# Patient Record
Sex: FEMALE | Race: WHITE | Employment: UNEMPLOYED | URBAN - METROPOLITAN AREA
[De-identification: names, ages, dates, MRNs, and addresses within clinical notes are randomized per-mention and may not be internally consistent; named-entity substitution may affect disease eponyms.]

---

## 2017-01-01 ENCOUNTER — CARE COORDINATION (OUTPATIENT)
Dept: CARE COORDINATION | Age: 82
End: 2017-01-01

## 2017-01-01 ENCOUNTER — CARE COORDINATION (OUTPATIENT)
Dept: CASE MANAGEMENT | Age: 82
End: 2017-01-01

## 2017-01-01 ENCOUNTER — HOSPITAL ENCOUNTER (OUTPATIENT)
Dept: PULMONOLOGY | Age: 82
Discharge: HOME | End: 2017-11-09
Payer: MEDICARE

## 2017-01-01 ENCOUNTER — OFFICE VISIT (OUTPATIENT)
Dept: INTERNAL MEDICINE CLINIC | Age: 82
End: 2017-01-01
Payer: MEDICARE

## 2017-01-01 ENCOUNTER — APPOINTMENT (OUTPATIENT)
Dept: CT IMAGING | Age: 82
DRG: 389 | End: 2017-01-01
Payer: MEDICARE

## 2017-01-01 ENCOUNTER — OFFICE VISIT (OUTPATIENT)
Dept: FAMILY MEDICINE CLINIC | Age: 82
End: 2017-01-01
Payer: MEDICARE

## 2017-01-01 ENCOUNTER — PROCEDURE VISIT (OUTPATIENT)
Dept: UROLOGY | Facility: CLINIC | Age: 82
End: 2017-01-01

## 2017-01-01 ENCOUNTER — TELEPHONE (OUTPATIENT)
Dept: FAMILY MEDICINE CLINIC | Age: 82
End: 2017-01-01

## 2017-01-01 ENCOUNTER — APPOINTMENT (OUTPATIENT)
Dept: CT IMAGING | Age: 82
End: 2017-01-01
Payer: MEDICARE

## 2017-01-01 ENCOUNTER — APPOINTMENT (OUTPATIENT)
Dept: GENERAL RADIOLOGY | Age: 82
DRG: 389 | End: 2017-01-01
Payer: MEDICARE

## 2017-01-01 ENCOUNTER — HOSPITAL ENCOUNTER (OUTPATIENT)
Age: 82
Discharge: HOME | End: 2017-10-26
Payer: MEDICARE

## 2017-01-01 ENCOUNTER — HOSPITAL ENCOUNTER (OUTPATIENT)
Age: 82
Setting detail: SPECIMEN
Discharge: HOME | End: 2017-06-12
Payer: MEDICARE

## 2017-01-01 ENCOUNTER — APPOINTMENT (OUTPATIENT)
Dept: INTERVENTIONAL RADIOLOGY/VASCULAR | Age: 82
DRG: 186 | End: 2017-01-01
Attending: INTERNAL MEDICINE
Payer: MEDICARE

## 2017-01-01 ENCOUNTER — APPOINTMENT (OUTPATIENT)
Dept: GENERAL RADIOLOGY | Age: 82
DRG: 186 | End: 2017-01-01
Attending: INTERNAL MEDICINE
Payer: MEDICARE

## 2017-01-01 ENCOUNTER — HOSPITAL ENCOUNTER (INPATIENT)
Age: 82
LOS: 5 days | Discharge: NURSING, REHAB OR LONG-TERM CARE | DRG: 389 | End: 2017-06-18
Attending: EMERGENCY MEDICINE | Admitting: INTERNAL MEDICINE
Payer: MEDICARE

## 2017-01-01 ENCOUNTER — HOSPITAL ENCOUNTER (INPATIENT)
Age: 82
LOS: 3 days | Discharge: HOME HEALTH | DRG: 186 | End: 2017-11-17
Attending: INTERNAL MEDICINE | Admitting: INTERNAL MEDICINE
Payer: MEDICARE

## 2017-01-01 ENCOUNTER — HOSPITAL ENCOUNTER (EMERGENCY)
Age: 82
Discharge: HOME | End: 2017-06-05
Attending: EMERGENCY MEDICINE
Payer: MEDICARE

## 2017-01-01 ENCOUNTER — HOSPITAL ENCOUNTER (OUTPATIENT)
Age: 82
Setting detail: SPECIMEN
Discharge: HOME | End: 2017-06-22
Payer: MEDICARE

## 2017-01-01 ENCOUNTER — HOSPITAL ENCOUNTER (OUTPATIENT)
Age: 82
Setting detail: SPECIMEN
Discharge: HOME | End: 2017-06-13
Payer: MEDICARE

## 2017-01-01 ENCOUNTER — APPOINTMENT (OUTPATIENT)
Dept: GENERAL RADIOLOGY | Age: 82
End: 2017-01-01
Payer: MEDICARE

## 2017-01-01 ENCOUNTER — HOSPITAL ENCOUNTER (OUTPATIENT)
Dept: CT IMAGING | Age: 82
Discharge: HOME | End: 2017-11-09
Payer: MEDICARE

## 2017-01-01 ENCOUNTER — HOSPITAL ENCOUNTER (OUTPATIENT)
Age: 82
Setting detail: SPECIMEN
Discharge: HOME | End: 2017-06-28
Payer: MEDICARE

## 2017-01-01 ENCOUNTER — PROCEDURE VISIT (OUTPATIENT)
Dept: UROLOGY | Age: 82
End: 2017-01-01
Payer: MEDICARE

## 2017-01-01 ENCOUNTER — HOSPITAL ENCOUNTER (EMERGENCY)
Age: 82
Discharge: HOME | End: 2017-06-03
Attending: EMERGENCY MEDICINE
Payer: MEDICARE

## 2017-01-01 ENCOUNTER — HOSPITAL ENCOUNTER (OUTPATIENT)
Age: 82
Setting detail: SPECIMEN
Discharge: HOME | End: 2017-06-26
Payer: MEDICARE

## 2017-01-01 ENCOUNTER — HOSPITAL ENCOUNTER (OUTPATIENT)
Age: 82
Setting detail: SPECIMEN
Discharge: HOME | End: 2017-06-27
Payer: MEDICARE

## 2017-01-01 ENCOUNTER — CARE COORDINATION (OUTPATIENT)
Dept: FAMILY MEDICINE CLINIC | Age: 82
End: 2017-01-01

## 2017-01-01 VITALS
WEIGHT: 107.58 LBS | SYSTOLIC BLOOD PRESSURE: 158 MMHG | HEIGHT: 55 IN | DIASTOLIC BLOOD PRESSURE: 80 MMHG | HEART RATE: 64 BPM | BODY MASS INDEX: 24.9 KG/M2 | RESPIRATION RATE: 16 BRPM

## 2017-01-01 VITALS
TEMPERATURE: 97.9 F | DIASTOLIC BLOOD PRESSURE: 72 MMHG | HEIGHT: 55 IN | HEART RATE: 85 BPM | BODY MASS INDEX: 22.77 KG/M2 | OXYGEN SATURATION: 99 % | RESPIRATION RATE: 20 BRPM | WEIGHT: 98.4 LBS | SYSTOLIC BLOOD PRESSURE: 138 MMHG

## 2017-01-01 VITALS
HEIGHT: 55 IN | BODY MASS INDEX: 21.52 KG/M2 | SYSTOLIC BLOOD PRESSURE: 138 MMHG | HEART RATE: 76 BPM | WEIGHT: 93 LBS | OXYGEN SATURATION: 97 % | DIASTOLIC BLOOD PRESSURE: 80 MMHG

## 2017-01-01 VITALS
SYSTOLIC BLOOD PRESSURE: 138 MMHG | WEIGHT: 65.26 LBS | BODY MASS INDEX: 15.1 KG/M2 | DIASTOLIC BLOOD PRESSURE: 59 MMHG | RESPIRATION RATE: 18 BRPM | TEMPERATURE: 97.5 F | HEIGHT: 55 IN | HEART RATE: 73 BPM | OXYGEN SATURATION: 93 %

## 2017-01-01 VITALS
DIASTOLIC BLOOD PRESSURE: 80 MMHG | SYSTOLIC BLOOD PRESSURE: 130 MMHG | BODY MASS INDEX: 20.6 KG/M2 | WEIGHT: 89 LBS | HEIGHT: 55 IN

## 2017-01-01 VITALS
SYSTOLIC BLOOD PRESSURE: 150 MMHG | TEMPERATURE: 97.5 F | HEART RATE: 83 BPM | RESPIRATION RATE: 18 BRPM | WEIGHT: 107.58 LBS | DIASTOLIC BLOOD PRESSURE: 81 MMHG | BODY MASS INDEX: 24.9 KG/M2 | OXYGEN SATURATION: 93 % | HEIGHT: 55 IN

## 2017-01-01 VITALS
DIASTOLIC BLOOD PRESSURE: 106 MMHG | TEMPERATURE: 97.7 F | HEART RATE: 76 BPM | RESPIRATION RATE: 18 BRPM | OXYGEN SATURATION: 98 % | SYSTOLIC BLOOD PRESSURE: 169 MMHG

## 2017-01-01 VITALS
DIASTOLIC BLOOD PRESSURE: 60 MMHG | TEMPERATURE: 97.7 F | BODY MASS INDEX: 22.7 KG/M2 | RESPIRATION RATE: 16 BRPM | SYSTOLIC BLOOD PRESSURE: 135 MMHG | HEIGHT: 55 IN | HEART RATE: 79 BPM | WEIGHT: 98.11 LBS

## 2017-01-01 VITALS
SYSTOLIC BLOOD PRESSURE: 110 MMHG | WEIGHT: 82 LBS | HEIGHT: 55 IN | BODY MASS INDEX: 18.97 KG/M2 | DIASTOLIC BLOOD PRESSURE: 80 MMHG

## 2017-01-01 VITALS
HEART RATE: 76 BPM | WEIGHT: 98 LBS | TEMPERATURE: 97.4 F | RESPIRATION RATE: 19 BRPM | HEIGHT: 55 IN | OXYGEN SATURATION: 98 % | BODY MASS INDEX: 22.68 KG/M2 | SYSTOLIC BLOOD PRESSURE: 120 MMHG | DIASTOLIC BLOOD PRESSURE: 80 MMHG

## 2017-01-01 VITALS
HEIGHT: 55 IN | DIASTOLIC BLOOD PRESSURE: 60 MMHG | WEIGHT: 96 LBS | SYSTOLIC BLOOD PRESSURE: 170 MMHG | BODY MASS INDEX: 22.21 KG/M2

## 2017-01-01 DIAGNOSIS — I10 ESSENTIAL HYPERTENSION: ICD-10-CM

## 2017-01-01 DIAGNOSIS — I50.32 CHRONIC DIASTOLIC CHF (CONGESTIVE HEART FAILURE) (HCC): ICD-10-CM

## 2017-01-01 DIAGNOSIS — C67.8 MALIGNANT NEOPLASM OF OVERLAPPING SITES OF BLADDER (HCC): Primary | ICD-10-CM

## 2017-01-01 DIAGNOSIS — R06.02 SOB (SHORTNESS OF BREATH): ICD-10-CM

## 2017-01-01 DIAGNOSIS — M25.551 HIP PAIN, BILATERAL: Primary | ICD-10-CM

## 2017-01-01 DIAGNOSIS — I10 ESSENTIAL HYPERTENSION: Primary | Chronic | ICD-10-CM

## 2017-01-01 DIAGNOSIS — N17.9 AKI (ACUTE KIDNEY INJURY) (HCC): ICD-10-CM

## 2017-01-01 DIAGNOSIS — K90.89 OTHER SPECIFIED INTESTINAL MALABSORPTION: ICD-10-CM

## 2017-01-01 DIAGNOSIS — R33.8 ACUTE URINARY RETENTION: ICD-10-CM

## 2017-01-01 DIAGNOSIS — C67.4 MALIGNANT NEOPLASM OF POSTERIOR WALL OF URINARY BLADDER (HCC): ICD-10-CM

## 2017-01-01 DIAGNOSIS — E87.5 HYPERKALEMIA: ICD-10-CM

## 2017-01-01 DIAGNOSIS — Z93.3 STATUS POST COLOSTOMY (HCC): ICD-10-CM

## 2017-01-01 DIAGNOSIS — I10 ESSENTIAL HYPERTENSION: Chronic | ICD-10-CM

## 2017-01-01 DIAGNOSIS — Z85.038 HISTORY OF COLON CANCER: ICD-10-CM

## 2017-01-01 DIAGNOSIS — M25.552 HIP PAIN, BILATERAL: Primary | ICD-10-CM

## 2017-01-01 DIAGNOSIS — M79.604 PAIN OF RIGHT LOWER EXTREMITY: Primary | ICD-10-CM

## 2017-01-01 DIAGNOSIS — K57.33 DIVERTICULITIS OF LARGE INTESTINE WITHOUT PERFORATION OR ABSCESS WITH BLEEDING: ICD-10-CM

## 2017-01-01 DIAGNOSIS — R54 FRAIL ELDERLY: ICD-10-CM

## 2017-01-01 DIAGNOSIS — K56.609 SBO (SMALL BOWEL OBSTRUCTION) (HCC): ICD-10-CM

## 2017-01-01 DIAGNOSIS — D50.8 OTHER IRON DEFICIENCY ANEMIA: Primary | ICD-10-CM

## 2017-01-01 DIAGNOSIS — J34.89 LESION OF NOSE: ICD-10-CM

## 2017-01-01 DIAGNOSIS — J90 PLEURAL EFFUSION: ICD-10-CM

## 2017-01-01 DIAGNOSIS — N17.9 AKI (ACUTE KIDNEY INJURY) (HCC): Primary | ICD-10-CM

## 2017-01-01 DIAGNOSIS — E55.9 VITAMIN D DEFICIENCY: ICD-10-CM

## 2017-01-01 DIAGNOSIS — I50.32 CHRONIC DIASTOLIC CHF (CONGESTIVE HEART FAILURE) (HCC): Primary | ICD-10-CM

## 2017-01-01 DIAGNOSIS — R53.83 FATIGUE, UNSPECIFIED TYPE: ICD-10-CM

## 2017-01-01 DIAGNOSIS — R06.02 SOB (SHORTNESS OF BREATH): Primary | ICD-10-CM

## 2017-01-01 DIAGNOSIS — Z23 NEED FOR TDAP VACCINATION: ICD-10-CM

## 2017-01-01 DIAGNOSIS — I50.32 CHRONIC DIASTOLIC CONGESTIVE HEART FAILURE (HCC): ICD-10-CM

## 2017-01-01 DIAGNOSIS — J43.1 PANLOBULAR EMPHYSEMA (HCC): ICD-10-CM

## 2017-01-01 LAB
-: ABNORMAL
ABO/RH: NORMAL
ABSOLUTE BANDS #: 0.3 K/UL (ref 0–1)
ABSOLUTE BANDS #: 0.47 K/UL (ref 0–1)
ABSOLUTE BANDS #: 1.04 K/UL (ref 0–1)
ABSOLUTE EOS #: 0 K/UL (ref 0–0.4)
ABSOLUTE EOS #: 0.1 K/UL (ref 0–0.4)
ABSOLUTE EOS #: 0.1 K/UL (ref 0–0.4)
ABSOLUTE EOS #: 0.2 K/UL (ref 0–0.4)
ABSOLUTE IMMATURE GRANULOCYTE: ABNORMAL K/UL (ref 0–0.3)
ABSOLUTE LYMPH #: 0.44 K/UL (ref 1–4.8)
ABSOLUTE LYMPH #: 0.5 K/UL (ref 1–4.8)
ABSOLUTE LYMPH #: 0.7 K/UL (ref 1–4.8)
ABSOLUTE LYMPH #: 0.9 K/UL (ref 1–4.8)
ABSOLUTE LYMPH #: 1 K/UL (ref 1–4.8)
ABSOLUTE LYMPH #: 1.15 K/UL (ref 1–4.8)
ABSOLUTE LYMPH #: 1.26 K/UL (ref 1–4.8)
ABSOLUTE LYMPH #: 1.57 K/UL (ref 1–4.8)
ABSOLUTE MONO #: 0.45 K/UL (ref 0.1–1.3)
ABSOLUTE MONO #: 0.6 K/UL (ref 0.1–1.3)
ABSOLUTE MONO #: 0.63 K/UL (ref 0.1–1.3)
ABSOLUTE MONO #: 0.67 K/UL (ref 0.1–1.3)
ABSOLUTE MONO #: 0.89 K/UL (ref 0.1–1.3)
ABSOLUTE MONO #: 0.9 K/UL (ref 0.1–1.3)
ABSOLUTE MONO #: 1.2 K/UL (ref 0.1–1.3)
ABSOLUTE MONO #: 1.42 K/UL (ref 0.1–1.3)
ALBUMIN FLUID: 2.2 G/DL
ALBUMIN SERPL-MCNC: 3.5 G/DL (ref 3.5–5.2)
ALBUMIN/GLOBULIN RATIO: ABNORMAL (ref 1–2.5)
ALP BLD-CCNC: 204 U/L (ref 35–104)
ALT SERPL-CCNC: 32 U/L (ref 5–33)
AMORPHOUS: ABNORMAL
ANION GAP SERPL CALCULATED.3IONS-SCNC: 13 MMOL/L (ref 9–17)
ANION GAP SERPL CALCULATED.3IONS-SCNC: 14 MMOL/L (ref 9–17)
ANION GAP SERPL CALCULATED.3IONS-SCNC: 15 MMOL/L (ref 9–17)
ANION GAP SERPL CALCULATED.3IONS-SCNC: 15 MMOL/L (ref 9–17)
ANION GAP SERPL CALCULATED.3IONS-SCNC: 16 MMOL/L (ref 9–17)
ANION GAP SERPL CALCULATED.3IONS-SCNC: 16 MMOL/L (ref 9–17)
ANION GAP SERPL CALCULATED.3IONS-SCNC: 17 MMOL/L (ref 9–17)
ANION GAP SERPL CALCULATED.3IONS-SCNC: 18 MMOL/L (ref 9–17)
ANION GAP SERPL CALCULATED.3IONS-SCNC: 19 MMOL/L (ref 9–17)
ANION GAP SERPL CALCULATED.3IONS-SCNC: 20 MMOL/L (ref 9–17)
ANTIBODY SCREEN: NEGATIVE
ARM BAND NUMBER: NORMAL
AST SERPL-CCNC: 39 U/L
BACTERIA: ABNORMAL
BANDS: 2 %
BANDS: 3 %
BANDS: 7 %
BASOPHILS # BLD: 0 %
BASOPHILS # BLD: 1 %
BASOPHILS # BLD: 1 %
BASOPHILS ABSOLUTE: 0 K/UL (ref 0–0.2)
BASOPHILS ABSOLUTE: 0.1 K/UL (ref 0–0.2)
BASOPHILS ABSOLUTE: 0.1 K/UL (ref 0–0.2)
BASOPHILS ABSOLUTE: 0.15 K/UL (ref 0–0.2)
BILIRUB SERPL-MCNC: 0.65 MG/DL (ref 0.3–1.2)
BILIRUBIN DIRECT: 0.37 MG/DL
BILIRUBIN URINE: NEGATIVE
BILIRUBIN, INDIRECT: 0.28 MG/DL (ref 0–1)
BLD PROD TYP BPU: NORMAL
BLOOD BANK COMMENT: NORMAL
BNP INTERPRETATION: ABNORMAL
BUN BLDV-MCNC: 11 MG/DL (ref 8–23)
BUN BLDV-MCNC: 11 MG/DL (ref 8–23)
BUN BLDV-MCNC: 13 MG/DL (ref 8–23)
BUN BLDV-MCNC: 15 MG/DL
BUN BLDV-MCNC: 15 MG/DL (ref 8–23)
BUN BLDV-MCNC: 23 MG/DL (ref 8–23)
BUN BLDV-MCNC: 24 MG/DL (ref 8–23)
BUN BLDV-MCNC: 25 MG/DL (ref 8–23)
BUN BLDV-MCNC: 29 MG/DL (ref 8–23)
BUN BLDV-MCNC: 54 MG/DL (ref 8–23)
BUN BLDV-MCNC: 71 MG/DL (ref 8–23)
BUN BLDV-MCNC: 72 MG/DL (ref 8–23)
BUN BLDV-MCNC: 74 MG/DL (ref 8–23)
BUN BLDV-MCNC: 8 MG/DL (ref 8–23)
BUN/CREAT BLD: 13 (ref 9–20)
BUN/CREAT BLD: 27 (ref 9–20)
BUN/CREAT BLD: 33 (ref 9–20)
BUN/CREAT BLD: ABNORMAL (ref 9–20)
BUN/CREAT BLD: NORMAL (ref 9–20)
CALCIUM SERPL-MCNC: 7.6 MG/DL (ref 8.6–10.4)
CALCIUM SERPL-MCNC: 8 MG/DL (ref 8.6–10.4)
CALCIUM SERPL-MCNC: 8 MG/DL (ref 8.6–10.4)
CALCIUM SERPL-MCNC: 8.3 MG/DL (ref 8.6–10.4)
CALCIUM SERPL-MCNC: 8.5 MG/DL (ref 8.6–10.4)
CALCIUM SERPL-MCNC: 8.7 MG/DL (ref 8.6–10.4)
CALCIUM SERPL-MCNC: 8.8 MG/DL (ref 8.6–10.4)
CALCIUM SERPL-MCNC: 9 MG/DL (ref 8.6–10.4)
CALCIUM SERPL-MCNC: 9.3 MG/DL (ref 8.6–10.4)
CALCIUM SERPL-MCNC: 9.4 MG/DL (ref 8.6–10.4)
CALCIUM SERPL-MCNC: 9.4 MG/DL (ref 8.6–10.4)
CALCIUM SERPL-MCNC: 9.5 MG/DL (ref 8.6–10.4)
CALCIUM SERPL-MCNC: 9.6 MG/DL (ref 8.6–10.4)
CALCIUM SERPL-MCNC: 9.7 MG/DL
CALCIUM SERPL-MCNC: 9.7 MG/DL (ref 8.6–10.4)
CALCIUM SERPL-MCNC: 9.8 MG/DL (ref 8.6–10.4)
CASTS UA: ABNORMAL /LPF
CASTS UA: ABNORMAL /LPF
CASTS UA: ABNORMAL /LPF (ref 0–2)
CHLORIDE BLD-SCNC: 100 MMOL/L (ref 98–107)
CHLORIDE BLD-SCNC: 100 MMOL/L (ref 98–107)
CHLORIDE BLD-SCNC: 101 MMOL/L (ref 98–107)
CHLORIDE BLD-SCNC: 102 MMOL/L (ref 98–107)
CHLORIDE BLD-SCNC: 103 MMOL/L (ref 98–107)
CHLORIDE BLD-SCNC: 105 MMOL/L (ref 98–107)
CHLORIDE BLD-SCNC: 106 MMOL/L (ref 98–107)
CHLORIDE BLD-SCNC: 107 MMOL/L
CHLORIDE BLD-SCNC: 107 MMOL/L (ref 98–107)
CHLORIDE BLD-SCNC: 107 MMOL/L (ref 98–107)
CHLORIDE BLD-SCNC: 109 MMOL/L (ref 98–107)
CHLORIDE BLD-SCNC: 109 MMOL/L (ref 98–107)
CHLORIDE BLD-SCNC: 110 MMOL/L (ref 98–107)
CHLORIDE BLD-SCNC: 110 MMOL/L (ref 98–107)
CHOLESTEROL/HDL RATIO: 2.1
CHOLESTEROL: 109 MG/DL
CO2: 16 MMOL/L (ref 20–31)
CO2: 16 MMOL/L (ref 20–31)
CO2: 17 MMOL/L (ref 20–31)
CO2: 18 MMOL/L (ref 20–31)
CO2: 20 MMOL/L (ref 20–31)
CO2: 20 MMOL/L (ref 20–31)
CO2: 21 MMOL/L (ref 20–31)
CO2: 21 MMOL/L (ref 20–31)
CO2: 22 MMOL/L (ref 20–31)
CO2: 22 MMOL/L (ref 20–31)
CO2: 23 MMOL/L (ref 20–31)
CO2: 24 MMOL/L (ref 20–31)
CO2: 24 MMOL/L (ref 20–31)
CO2: 25 MMOL/L
COLOR: YELLOW
COMMENT UA: ABNORMAL
COMMENT UA: NORMAL
CREAT SERPL-MCNC: 0.65 MG/DL (ref 0.5–0.9)
CREAT SERPL-MCNC: 0.65 MG/DL (ref 0.5–0.9)
CREAT SERPL-MCNC: 0.68 MG/DL (ref 0.5–0.9)
CREAT SERPL-MCNC: 0.73 MG/DL (ref 0.5–0.9)
CREAT SERPL-MCNC: 0.79 MG/DL (ref 0.5–0.9)
CREAT SERPL-MCNC: 0.84 MG/DL (ref 0.5–0.9)
CREAT SERPL-MCNC: 0.84 MG/DL (ref 0.5–0.9)
CREAT SERPL-MCNC: 0.96 MG/DL (ref 0.5–0.9)
CREAT SERPL-MCNC: 1.11 MG/DL (ref 0.5–0.9)
CREAT SERPL-MCNC: 1.16 MG/DL
CREAT SERPL-MCNC: 1.26 MG/DL (ref 0.5–0.9)
CREAT SERPL-MCNC: 1.27 MG/DL (ref 0.5–0.9)
CREAT SERPL-MCNC: 1.52 MG/DL (ref 0.5–0.9)
CREAT SERPL-MCNC: 2.18 MG/DL (ref 0.5–0.9)
CREAT SERPL-MCNC: 2.44 MG/DL (ref 0.5–0.9)
CREAT SERPL-MCNC: 2.71 MG/DL (ref 0.5–0.9)
CRYSTALS, UA: ABNORMAL /HPF
CULTURE: ABNORMAL
CULTURE: NO GROWTH
CULTURE: NO GROWTH
CULTURE: NORMAL
DIFFERENTIAL TYPE: ABNORMAL
DISPENSE STATUS BLOOD BANK: NORMAL
EKG ATRIAL RATE: 108 BPM
EKG ATRIAL RATE: 82 BPM
EKG P AXIS: 64 DEGREES
EKG P-R INTERVAL: 130 MS
EKG Q-T INTERVAL: 352 MS
EKG Q-T INTERVAL: 392 MS
EKG QRS DURATION: 106 MS
EKG QRS DURATION: 116 MS
EKG QTC CALCULATION (BAZETT): 457 MS
EKG QTC CALCULATION (BAZETT): 471 MS
EKG R AXIS: 12 DEGREES
EKG R AXIS: 6 DEGREES
EKG T AXIS: 116 DEGREES
EKG T AXIS: 142 DEGREES
EKG VENTRICULAR RATE: 108 BPM
EKG VENTRICULAR RATE: 82 BPM
EOSINOPHILS RELATIVE PERCENT: 0 %
EOSINOPHILS RELATIVE PERCENT: 1 %
EPITHELIAL CELLS UA: ABNORMAL /HPF
EPITHELIAL CELLS UA: ABNORMAL /HPF
EPITHELIAL CELLS UA: ABNORMAL /HPF (ref 0–5)
EXPIRATION DATE: NORMAL
GFR AFRICAN AMERICAN: 20 ML/MIN
GFR AFRICAN AMERICAN: 22 ML/MIN
GFR AFRICAN AMERICAN: 25 ML/MIN
GFR AFRICAN AMERICAN: 39 ML/MIN
GFR AFRICAN AMERICAN: 47 ML/MIN
GFR AFRICAN AMERICAN: 48 ML/MIN
GFR AFRICAN AMERICAN: 55 ML/MIN
GFR AFRICAN AMERICAN: >60 ML/MIN
GFR CALCULATED: 43
GFR NON-AFRICAN AMERICAN: 16 ML/MIN
GFR NON-AFRICAN AMERICAN: 18 ML/MIN
GFR NON-AFRICAN AMERICAN: 21 ML/MIN
GFR NON-AFRICAN AMERICAN: 32 ML/MIN
GFR NON-AFRICAN AMERICAN: 39 ML/MIN
GFR NON-AFRICAN AMERICAN: 39 ML/MIN
GFR NON-AFRICAN AMERICAN: 46 ML/MIN
GFR NON-AFRICAN AMERICAN: 54 ML/MIN
GFR NON-AFRICAN AMERICAN: >60 ML/MIN
GFR SERPL CREATININE-BSD FRML MDRD: ABNORMAL ML/MIN/{1.73_M2}
GFR SERPL CREATININE-BSD FRML MDRD: NORMAL ML/MIN/{1.73_M2}
GLOBULIN: ABNORMAL G/DL (ref 1.5–3.8)
GLUCOSE BLD-MCNC: 100 MG/DL (ref 70–99)
GLUCOSE BLD-MCNC: 102 MG/DL (ref 70–99)
GLUCOSE BLD-MCNC: 103 MG/DL
GLUCOSE BLD-MCNC: 110 MG/DL (ref 70–99)
GLUCOSE BLD-MCNC: 110 MG/DL (ref 70–99)
GLUCOSE BLD-MCNC: 137 MG/DL (ref 70–99)
GLUCOSE BLD-MCNC: 141 MG/DL (ref 70–99)
GLUCOSE BLD-MCNC: 146 MG/DL (ref 70–99)
GLUCOSE BLD-MCNC: 153 MG/DL (ref 70–99)
GLUCOSE BLD-MCNC: 155 MG/DL (ref 70–99)
GLUCOSE BLD-MCNC: 172 MG/DL (ref 70–99)
GLUCOSE BLD-MCNC: 199 MG/DL (ref 70–99)
GLUCOSE BLD-MCNC: 77 MG/DL (ref 70–99)
GLUCOSE BLD-MCNC: 80 MG/DL (ref 70–99)
GLUCOSE BLD-MCNC: 90 MG/DL (ref 70–99)
GLUCOSE BLD-MCNC: 98 MG/DL (ref 70–99)
GLUCOSE URINE: NEGATIVE
GLUCOSE, FLUID: 152 MG/DL
HCT VFR BLD CALC: 29.7 % (ref 36–46)
HCT VFR BLD CALC: 31.2 % (ref 36–46)
HCT VFR BLD CALC: 32.1 % (ref 36–46)
HCT VFR BLD CALC: 32.2 % (ref 36–46)
HCT VFR BLD CALC: 32.2 % (ref 36–46)
HCT VFR BLD CALC: 32.7 % (ref 36–46)
HCT VFR BLD CALC: 34.6 % (ref 36–46)
HCT VFR BLD CALC: 34.9 % (ref 36–46)
HCT VFR BLD CALC: 39.8 % (ref 36–46)
HCT VFR BLD CALC: 40.9 % (ref 36–46)
HDLC SERPL-MCNC: 51 MG/DL
HEMOGLOBIN: 10.2 G/DL (ref 12–16)
HEMOGLOBIN: 10.4 G/DL (ref 12–16)
HEMOGLOBIN: 10.6 G/DL (ref 12–16)
HEMOGLOBIN: 10.6 G/DL (ref 12–16)
HEMOGLOBIN: 10.8 G/DL (ref 12–16)
HEMOGLOBIN: 11.3 G/DL (ref 12–16)
HEMOGLOBIN: 11.4 G/DL (ref 12–16)
HEMOGLOBIN: 13 G/DL (ref 12–16)
HEMOGLOBIN: 13.5 G/DL (ref 12–16)
HEMOGLOBIN: 9.8 G/DL (ref 12–16)
IMMATURE GRANULOCYTES: ABNORMAL %
INR BLD: 1.1
KETONES, URINE: NEGATIVE
LACTATE DEHYDROGENASE, FLUID: 202 U/L
LACTATE DEHYDROGENASE: 183 U/L (ref 135–214)
LACTIC ACID, WHOLE BLOOD: NORMAL MMOL/L (ref 0.7–2.1)
LACTIC ACID: 0.9 MMOL/L (ref 0.5–2.2)
LACTIC ACID: 1.1 MMOL/L (ref 0.5–2.2)
LACTIC ACID: 1.5 MMOL/L (ref 0.5–2.2)
LACTIC ACID: 1.8 MMOL/L (ref 0.5–2.2)
LDL CHOLESTEROL: 42 MG/DL (ref 0–130)
LEUKOCYTE ESTERASE, URINE: ABNORMAL
LEUKOCYTE ESTERASE, URINE: NEGATIVE
LIPASE: 36 U/L (ref 13–60)
LV EF: 40 %
LVEF MODALITY: NORMAL
LYMPHOCYTES # BLD: 10 %
LYMPHOCYTES # BLD: 12 %
LYMPHOCYTES # BLD: 3 %
LYMPHOCYTES # BLD: 5 %
LYMPHOCYTES # BLD: 6 %
LYMPHOCYTES # BLD: 6 %
LYMPHOCYTES # BLD: 8 %
LYMPHOCYTES # BLD: 9 %
Lab: ABNORMAL
Lab: ABNORMAL
Lab: NORMAL
MAGNESIUM: 1.8 MG/DL (ref 1.6–2.6)
MAGNESIUM: 2 MG/DL (ref 1.6–2.6)
MCH RBC QN AUTO: 29.3 PG (ref 26–34)
MCH RBC QN AUTO: 29.4 PG (ref 26–34)
MCH RBC QN AUTO: 29.4 PG (ref 26–34)
MCH RBC QN AUTO: 29.5 PG (ref 26–34)
MCH RBC QN AUTO: 29.6 PG (ref 26–34)
MCH RBC QN AUTO: 29.7 PG (ref 26–34)
MCH RBC QN AUTO: 29.7 PG (ref 26–34)
MCH RBC QN AUTO: 30 PG (ref 26–34)
MCH RBC QN AUTO: 30.6 PG (ref 26–34)
MCH RBC QN AUTO: 30.6 PG (ref 26–34)
MCHC RBC AUTO-ENTMCNC: 32.4 G/DL (ref 31–37)
MCHC RBC AUTO-ENTMCNC: 32.5 G/DL (ref 31–37)
MCHC RBC AUTO-ENTMCNC: 32.5 G/DL (ref 31–37)
MCHC RBC AUTO-ENTMCNC: 32.6 G/DL (ref 31–37)
MCHC RBC AUTO-ENTMCNC: 32.9 G/DL (ref 31–37)
MCV RBC AUTO: 89.4 FL (ref 80–100)
MCV RBC AUTO: 89.4 FL (ref 80–100)
MCV RBC AUTO: 90.1 FL (ref 80–100)
MCV RBC AUTO: 90.2 FL (ref 80–100)
MCV RBC AUTO: 90.4 FL (ref 80–100)
MCV RBC AUTO: 90.5 FL (ref 80–100)
MCV RBC AUTO: 90.6 FL (ref 80–100)
MCV RBC AUTO: 92.5 FL (ref 80–100)
MCV RBC AUTO: 93 FL (ref 80–100)
MCV RBC AUTO: 93.9 FL (ref 80–100)
METAMYELOCYTES ABSOLUTE COUNT: 0.16 K/UL
METAMYELOCYTES ABSOLUTE COUNT: 0.3 K/UL
METAMYELOCYTES: 1 %
METAMYELOCYTES: 2 %
MONOCYTES # BLD: 12 %
MONOCYTES # BLD: 3 %
MONOCYTES # BLD: 4 %
MONOCYTES # BLD: 6 %
MONOCYTES # BLD: 6 %
MONOCYTES # BLD: 7 %
MONOCYTES # BLD: 8 %
MONOCYTES # BLD: 9 %
MORPHOLOGY: ABNORMAL
MORPHOLOGY: NORMAL
MUCUS: ABNORMAL
NITRITE, URINE: NEGATIVE
NITRITE, URINE: POSITIVE
ORGANISM: ABNORMAL
ORGANISM: ABNORMAL
OTHER OBSERVATIONS UA: ABNORMAL
PARTIAL THROMBOPLASTIN TIME: 27.3 SEC (ref 23–31)
PDW BLD-RTO: 13.5 % (ref 11.5–14.9)
PDW BLD-RTO: 13.7 % (ref 11.5–14.9)
PDW BLD-RTO: 13.7 % (ref 11.5–14.9)
PDW BLD-RTO: 13.8 % (ref 11.5–14.9)
PDW BLD-RTO: 13.8 % (ref 11.5–14.9)
PDW BLD-RTO: 13.9 % (ref 11.5–14.9)
PDW BLD-RTO: 14.2 % (ref 11.5–14.9)
PDW BLD-RTO: 14.6 % (ref 11.5–14.5)
PDW BLD-RTO: 17 % (ref 11.5–14.9)
PDW BLD-RTO: 17.7 % (ref 11.5–14.9)
PH FLUID: 8
PH UA: 5 (ref 5–8)
PH UA: 5.5 (ref 5–8)
PH UA: 5.5 (ref 5–8)
PH UA: 6 (ref 5–8)
PLATELET # BLD: 186 K/UL (ref 150–450)
PLATELET # BLD: 222 K/UL (ref 150–450)
PLATELET # BLD: 225 K/UL (ref 150–450)
PLATELET # BLD: 229 K/UL (ref 150–450)
PLATELET # BLD: 261 K/UL (ref 150–450)
PLATELET # BLD: 266 K/UL (ref 150–450)
PLATELET # BLD: 270 K/UL (ref 150–450)
PLATELET # BLD: 273 K/UL (ref 150–450)
PLATELET # BLD: 275 K/UL (ref 130–400)
PLATELET # BLD: 286 K/UL (ref 150–450)
PLATELET ESTIMATE: ABNORMAL
PMV BLD AUTO: 10 FL (ref 6–12)
PMV BLD AUTO: 10 FL (ref 6–12)
PMV BLD AUTO: 8.9 FL (ref 6–12)
PMV BLD AUTO: 9.1 FL (ref 6–12)
PMV BLD AUTO: 9.3 FL (ref 6–12)
PMV BLD AUTO: 9.5 FL (ref 6–12)
PMV BLD AUTO: 9.9 FL (ref 6–12)
POTASSIUM SERPL-SCNC: 2.9 MMOL/L (ref 3.7–5.3)
POTASSIUM SERPL-SCNC: 3.3 MMOL/L (ref 3.7–5.3)
POTASSIUM SERPL-SCNC: 3.6 MMOL/L (ref 3.7–5.3)
POTASSIUM SERPL-SCNC: 3.7 MMOL/L (ref 3.7–5.3)
POTASSIUM SERPL-SCNC: 3.8 MMOL/L (ref 3.7–5.3)
POTASSIUM SERPL-SCNC: 3.9 MMOL/L (ref 3.7–5.3)
POTASSIUM SERPL-SCNC: 4 MMOL/L (ref 3.7–5.3)
POTASSIUM SERPL-SCNC: 4.3 MMOL/L (ref 3.7–5.3)
POTASSIUM SERPL-SCNC: 4.4 MMOL/L (ref 3.7–5.3)
POTASSIUM SERPL-SCNC: 4.6 MMOL/L (ref 3.7–5.3)
POTASSIUM SERPL-SCNC: 4.7 MMOL/L
POTASSIUM SERPL-SCNC: 4.9 MMOL/L (ref 3.7–5.3)
POTASSIUM SERPL-SCNC: 5.2 MMOL/L (ref 3.7–5.3)
POTASSIUM SERPL-SCNC: 5.2 MMOL/L (ref 3.7–5.3)
POTASSIUM SERPL-SCNC: 5.3 MMOL/L (ref 3.7–5.3)
POTASSIUM SERPL-SCNC: 5.3 MMOL/L (ref 3.7–5.3)
POTASSIUM SERPL-SCNC: 5.4 MMOL/L (ref 3.7–5.3)
POTASSIUM SERPL-SCNC: 5.6 MMOL/L (ref 3.7–5.3)
PRO-BNP: ABNORMAL PG/ML
PROCALCITONIN: 0.35 NG/ML
PROTEIN UA: ABNORMAL
PROTEIN UA: NEGATIVE
PROTHROMBIN TIME: 11.3 SEC (ref 9.7–12)
PROTHROMBIN TIME: 11.7 SEC (ref 9.7–12)
PROTHROMBIN TIME: 11.8 SEC (ref 9.7–12)
RBC # BLD: 3.29 M/UL (ref 4–5.2)
RBC # BLD: 3.44 M/UL (ref 4–5.2)
RBC # BLD: 3.47 M/UL (ref 4–5.2)
RBC # BLD: 3.56 M/UL (ref 4–5.2)
RBC # BLD: 3.6 M/UL (ref 4–5.2)
RBC # BLD: 3.66 M/UL (ref 4–5.2)
RBC # BLD: 3.83 M/UL (ref 4–5.2)
RBC # BLD: 3.87 M/UL (ref 4–5.2)
RBC # BLD: 4.24 M/UL (ref 4–5.2)
RBC # BLD: 4.4 M/UL (ref 4–5.2)
RBC # BLD: ABNORMAL 10*6/UL
RBC UA: ABNORMAL /HPF
RBC UA: ABNORMAL /HPF
RBC UA: ABNORMAL /HPF (ref 0–2)
RENAL EPITHELIAL, UA: ABNORMAL /HPF
SEG NEUTROPHILS: 78 %
SEG NEUTROPHILS: 80 %
SEG NEUTROPHILS: 82 %
SEG NEUTROPHILS: 82 %
SEG NEUTROPHILS: 83 %
SEG NEUTROPHILS: 86 %
SEG NEUTROPHILS: 87 %
SEG NEUTROPHILS: 88 %
SEGMENTED NEUTROPHILS ABSOLUTE COUNT: 12.13 K/UL (ref 1.3–9.1)
SEGMENTED NEUTROPHILS ABSOLUTE COUNT: 12.87 K/UL (ref 1.3–9.1)
SEGMENTED NEUTROPHILS ABSOLUTE COUNT: 13.12 K/UL (ref 1.3–9.1)
SEGMENTED NEUTROPHILS ABSOLUTE COUNT: 13.2 K/UL (ref 1.3–9.1)
SEGMENTED NEUTROPHILS ABSOLUTE COUNT: 7.68 K/UL (ref 1.3–9.1)
SEGMENTED NEUTROPHILS ABSOLUTE COUNT: 8.1 K/UL (ref 1.3–9.1)
SEGMENTED NEUTROPHILS ABSOLUTE COUNT: 9 K/UL (ref 1.3–9.1)
SEGMENTED NEUTROPHILS ABSOLUTE COUNT: 9.9 K/UL (ref 1.3–9.1)
SODIUM BLD-SCNC: 136 MMOL/L (ref 135–144)
SODIUM BLD-SCNC: 138 MMOL/L (ref 135–144)
SODIUM BLD-SCNC: 140 MMOL/L (ref 135–144)
SODIUM BLD-SCNC: 141 MMOL/L
SODIUM BLD-SCNC: 141 MMOL/L (ref 135–144)
SODIUM BLD-SCNC: 142 MMOL/L (ref 135–144)
SODIUM BLD-SCNC: 143 MMOL/L (ref 135–144)
SODIUM BLD-SCNC: 143 MMOL/L (ref 135–144)
SODIUM BLD-SCNC: 144 MMOL/L (ref 135–144)
SODIUM BLD-SCNC: 145 MMOL/L (ref 135–144)
SPECIFIC GRAVITY UA: 1.01 (ref 1–1.03)
SPECIMEN DESCRIPTION: ABNORMAL
SPECIMEN DESCRIPTION: ABNORMAL
SPECIMEN DESCRIPTION: NORMAL
SPECIMEN TYPE: NORMAL
STATUS: ABNORMAL
STATUS: NORMAL
SURGICAL PATHOLOGY REPORT: NORMAL
TOTAL PROTEIN: 7.3 G/DL (ref 6.4–8.3)
TRANSFUSION STATUS: NORMAL
TRICHOMONAS: ABNORMAL
TRIGL SERPL-MCNC: 80 MG/DL
TROPONIN INTERP: NORMAL
TROPONIN INTERP: NORMAL
TROPONIN T: <0.03 NG/ML
TROPONIN T: <0.03 NG/ML
TSH SERPL DL<=0.05 MIU/L-ACNC: 2.98 MIU/L (ref 0.3–5)
TURBIDITY: ABNORMAL
TURBIDITY: CLEAR
UNIT DIVISION: 0
UNIT NUMBER: NORMAL
URINE HGB: ABNORMAL
URINE HGB: NEGATIVE
UROBILINOGEN, URINE: NORMAL
VLDLC SERPL CALC-MCNC: NORMAL MG/DL (ref 1–30)
WBC # BLD: 10.2 K/UL (ref 3.5–11)
WBC # BLD: 10.3 K/UL (ref 3.5–11)
WBC # BLD: 10.5 K/UL (ref 3.5–11)
WBC # BLD: 11.6 K/UL (ref 3.5–11)
WBC # BLD: 12.9 K/UL (ref 3.5–11)
WBC # BLD: 14.8 K/UL (ref 3.5–11)
WBC # BLD: 15 K/UL (ref 3.5–11)
WBC # BLD: 15.7 K/UL (ref 3.5–11)
WBC # BLD: 15.8 K/UL (ref 3.5–11)
WBC # BLD: 9.6 K/UL (ref 3.5–11)
WBC # BLD: ABNORMAL 10*3/UL
WBC UA: ABNORMAL /HPF
WBC UA: ABNORMAL /HPF
WBC UA: ABNORMAL /HPF (ref 0–5)
YEAST: ABNORMAL

## 2017-01-01 PROCEDURE — 85025 COMPLETE CBC W/AUTO DIFF WBC: CPT

## 2017-01-01 PROCEDURE — 70450 CT HEAD/BRAIN W/O DYE: CPT

## 2017-01-01 PROCEDURE — 36415 COLL VENOUS BLD VENIPUNCTURE: CPT

## 2017-01-01 PROCEDURE — 83880 ASSAY OF NATRIURETIC PEPTIDE: CPT

## 2017-01-01 PROCEDURE — 80048 BASIC METABOLIC PNL TOTAL CA: CPT

## 2017-01-01 PROCEDURE — 6360000002 HC RX W HCPCS: Performed by: HOSPITALIST

## 2017-01-01 PROCEDURE — 81001 URINALYSIS AUTO W/SCOPE: CPT

## 2017-01-01 PROCEDURE — 74022 RADEX COMPL AQT ABD SERIES: CPT

## 2017-01-01 PROCEDURE — 97530 THERAPEUTIC ACTIVITIES: CPT

## 2017-01-01 PROCEDURE — 6370000000 HC RX 637 (ALT 250 FOR IP): Performed by: HOSPITALIST

## 2017-01-01 PROCEDURE — 6370000000 HC RX 637 (ALT 250 FOR IP): Performed by: FAMILY MEDICINE

## 2017-01-01 PROCEDURE — 99233 SBSQ HOSP IP/OBS HIGH 50: CPT | Performed by: INTERNAL MEDICINE

## 2017-01-01 PROCEDURE — G8427 DOCREV CUR MEDS BY ELIG CLIN: HCPCS | Performed by: INTERNAL MEDICINE

## 2017-01-01 PROCEDURE — 84132 ASSAY OF SERUM POTASSIUM: CPT

## 2017-01-01 PROCEDURE — 97112 NEUROMUSCULAR REEDUCATION: CPT

## 2017-01-01 PROCEDURE — 6360000004 HC RX CONTRAST MEDICATION: Performed by: EMERGENCY MEDICINE

## 2017-01-01 PROCEDURE — 83605 ASSAY OF LACTIC ACID: CPT

## 2017-01-01 PROCEDURE — 93306 TTE W/DOPPLER COMPLETE: CPT

## 2017-01-01 PROCEDURE — 72170 X-RAY EXAM OF PELVIS: CPT

## 2017-01-01 PROCEDURE — 1090F PRES/ABSN URINE INCON ASSESS: CPT | Performed by: INTERNAL MEDICINE

## 2017-01-01 PROCEDURE — 6370000000 HC RX 637 (ALT 250 FOR IP): Performed by: SURGERY

## 2017-01-01 PROCEDURE — 88305 TISSUE EXAM BY PATHOLOGIST: CPT

## 2017-01-01 PROCEDURE — 74176 CT ABD & PELVIS W/O CONTRAST: CPT

## 2017-01-01 PROCEDURE — 2060000000 HC ICU INTERMEDIATE R&B

## 2017-01-01 PROCEDURE — 3023F SPIROM DOC REV: CPT | Performed by: INTERNAL MEDICINE

## 2017-01-01 PROCEDURE — 83615 LACTATE (LD) (LDH) ENZYME: CPT

## 2017-01-01 PROCEDURE — 94664 DEMO&/EVAL PT USE INHALER: CPT

## 2017-01-01 PROCEDURE — 6360000002 HC RX W HCPCS: Performed by: INTERNAL MEDICINE

## 2017-01-01 PROCEDURE — 94728 AIRWY RESIST BY OSCILLOMETRY: CPT

## 2017-01-01 PROCEDURE — 6370000000 HC RX 637 (ALT 250 FOR IP): Performed by: INTERNAL MEDICINE

## 2017-01-01 PROCEDURE — 2580000003 HC RX 258: Performed by: FAMILY MEDICINE

## 2017-01-01 PROCEDURE — 87077 CULTURE AEROBIC IDENTIFY: CPT

## 2017-01-01 PROCEDURE — 88112 CYTOPATH CELL ENHANCE TECH: CPT

## 2017-01-01 PROCEDURE — 99214 OFFICE O/P EST MOD 30 MIN: CPT | Performed by: INTERNAL MEDICINE

## 2017-01-01 PROCEDURE — 96374 THER/PROPH/DIAG INJ IV PUSH: CPT

## 2017-01-01 PROCEDURE — 4040F PNEUMOC VAC/ADMIN/RCVD: CPT | Performed by: INTERNAL MEDICINE

## 2017-01-01 PROCEDURE — 85610 PROTHROMBIN TIME: CPT

## 2017-01-01 PROCEDURE — 71010 XR CHEST LIMITED: CPT

## 2017-01-01 PROCEDURE — 94762 N-INVAS EAR/PLS OXIMTRY CONT: CPT

## 2017-01-01 PROCEDURE — 2500000003 HC RX 250 WO HCPCS: Performed by: RADIOLOGY

## 2017-01-01 PROCEDURE — G0008 ADMIN INFLUENZA VIRUS VAC: HCPCS | Performed by: INTERNAL MEDICINE

## 2017-01-01 PROCEDURE — 99214 OFFICE O/P EST MOD 30 MIN: CPT | Performed by: FAMILY MEDICINE

## 2017-01-01 PROCEDURE — 6360000002 HC RX W HCPCS: Performed by: NURSE PRACTITIONER

## 2017-01-01 PROCEDURE — G8484 FLU IMMUNIZE NO ADMIN: HCPCS | Performed by: INTERNAL MEDICINE

## 2017-01-01 PROCEDURE — 2580000003 HC RX 258: Performed by: HOSPITALIST

## 2017-01-01 PROCEDURE — 85027 COMPLETE CBC AUTOMATED: CPT

## 2017-01-01 PROCEDURE — 99223 1ST HOSP IP/OBS HIGH 75: CPT | Performed by: INTERNAL MEDICINE

## 2017-01-01 PROCEDURE — 99285 EMERGENCY DEPT VISIT HI MDM: CPT

## 2017-01-01 PROCEDURE — G0379 DIRECT REFER HOSPITAL OBSERV: HCPCS

## 2017-01-01 PROCEDURE — 94760 N-INVAS EAR/PLS OXIMETRY 1: CPT

## 2017-01-01 PROCEDURE — 99283 EMERGENCY DEPT VISIT LOW MDM: CPT

## 2017-01-01 PROCEDURE — 90686 IIV4 VACC NO PRSV 0.5 ML IM: CPT | Performed by: INTERNAL MEDICINE

## 2017-01-01 PROCEDURE — 6360000002 HC RX W HCPCS: Performed by: FAMILY MEDICINE

## 2017-01-01 PROCEDURE — 6370000000 HC RX 637 (ALT 250 FOR IP): Performed by: EMERGENCY MEDICINE

## 2017-01-01 PROCEDURE — 71250 CT THORAX DX C-: CPT

## 2017-01-01 PROCEDURE — 88342 IMHCHEM/IMCYTCHM 1ST ANTB: CPT

## 2017-01-01 PROCEDURE — 2580000003 HC RX 258: Performed by: EMERGENCY MEDICINE

## 2017-01-01 PROCEDURE — G8420 CALC BMI NORM PARAMETERS: HCPCS | Performed by: INTERNAL MEDICINE

## 2017-01-01 PROCEDURE — 97110 THERAPEUTIC EXERCISES: CPT

## 2017-01-01 PROCEDURE — 94726 PLETHYSMOGRAPHY LUNG VOLUMES: CPT

## 2017-01-01 PROCEDURE — 6360000002 HC RX W HCPCS: Performed by: EMERGENCY MEDICINE

## 2017-01-01 PROCEDURE — G0378 HOSPITAL OBSERVATION PER HR: HCPCS

## 2017-01-01 PROCEDURE — 99239 HOSP IP/OBS DSCHRG MGMT >30: CPT | Performed by: INTERNAL MEDICINE

## 2017-01-01 PROCEDURE — 86850 RBC ANTIBODY SCREEN: CPT

## 2017-01-01 PROCEDURE — 86901 BLOOD TYPING SEROLOGIC RH(D): CPT

## 2017-01-01 PROCEDURE — 4040F PNEUMOC VAC/ADMIN/RCVD: CPT | Performed by: FAMILY MEDICINE

## 2017-01-01 PROCEDURE — 87086 URINE CULTURE/COLONY COUNT: CPT

## 2017-01-01 PROCEDURE — 84484 ASSAY OF TROPONIN QUANT: CPT

## 2017-01-01 PROCEDURE — 2580000003 HC RX 258: Performed by: INTERNAL MEDICINE

## 2017-01-01 PROCEDURE — 72100 X-RAY EXAM L-S SPINE 2/3 VWS: CPT

## 2017-01-01 PROCEDURE — 83735 ASSAY OF MAGNESIUM: CPT

## 2017-01-01 PROCEDURE — 83690 ASSAY OF LIPASE: CPT

## 2017-01-01 PROCEDURE — 51702 INSERT TEMP BLADDER CATH: CPT

## 2017-01-01 PROCEDURE — 1090F PRES/ABSN URINE INCON ASSESS: CPT | Performed by: FAMILY MEDICINE

## 2017-01-01 PROCEDURE — 1123F ACP DISCUSS/DSCN MKR DOCD: CPT | Performed by: FAMILY MEDICINE

## 2017-01-01 PROCEDURE — G8427 DOCREV CUR MEDS BY ELIG CLIN: HCPCS | Performed by: FAMILY MEDICINE

## 2017-01-01 PROCEDURE — 1123F ACP DISCUSS/DSCN MKR DOCD: CPT | Performed by: INTERNAL MEDICINE

## 2017-01-01 PROCEDURE — P9603 ONE-WAY ALLOW PRORATED MILES: HCPCS

## 2017-01-01 PROCEDURE — 2500000003 HC RX 250 WO HCPCS: Performed by: FAMILY MEDICINE

## 2017-01-01 PROCEDURE — 84145 PROCALCITONIN (PCT): CPT

## 2017-01-01 PROCEDURE — 87040 BLOOD CULTURE FOR BACTERIA: CPT

## 2017-01-01 PROCEDURE — 99495 TRANSJ CARE MGMT MOD F2F 14D: CPT | Performed by: INTERNAL MEDICINE

## 2017-01-01 PROCEDURE — 1036F TOBACCO NON-USER: CPT | Performed by: INTERNAL MEDICINE

## 2017-01-01 PROCEDURE — 97166 OT EVAL MOD COMPLEX 45 MIN: CPT

## 2017-01-01 PROCEDURE — 93970 EXTREMITY STUDY: CPT

## 2017-01-01 PROCEDURE — 81003 URINALYSIS AUTO W/O SCOPE: CPT

## 2017-01-01 PROCEDURE — 83986 ASSAY PH BODY FLUID NOS: CPT

## 2017-01-01 PROCEDURE — 87088 URINE BACTERIA CULTURE: CPT

## 2017-01-01 PROCEDURE — 80076 HEPATIC FUNCTION PANEL: CPT

## 2017-01-01 PROCEDURE — 87186 SC STD MICRODIL/AGAR DIL: CPT

## 2017-01-01 PROCEDURE — 94060 EVALUATION OF WHEEZING: CPT

## 2017-01-01 PROCEDURE — S0028 INJECTION, FAMOTIDINE, 20 MG: HCPCS | Performed by: FAMILY MEDICINE

## 2017-01-01 PROCEDURE — 93005 ELECTROCARDIOGRAM TRACING: CPT

## 2017-01-01 PROCEDURE — 82042 OTHER SOURCE ALBUMIN QUAN EA: CPT

## 2017-01-01 PROCEDURE — G8926 SPIRO NO PERF OR DOC: HCPCS | Performed by: INTERNAL MEDICINE

## 2017-01-01 PROCEDURE — 32555 ASPIRATE PLEURA W/ IMAGING: CPT | Performed by: RADIOLOGY

## 2017-01-01 PROCEDURE — G8419 CALC BMI OUT NRM PARAM NOF/U: HCPCS | Performed by: FAMILY MEDICINE

## 2017-01-01 PROCEDURE — 80061 LIPID PANEL: CPT

## 2017-01-01 PROCEDURE — 85730 THROMBOPLASTIN TIME PARTIAL: CPT

## 2017-01-01 PROCEDURE — 97162 PT EVAL MOD COMPLEX 30 MIN: CPT

## 2017-01-01 PROCEDURE — 0W993ZZ DRAINAGE OF RIGHT PLEURAL CAVITY, PERCUTANEOUS APPROACH: ICD-10-PCS | Performed by: RADIOLOGY

## 2017-01-01 PROCEDURE — 96361 HYDRATE IV INFUSION ADD-ON: CPT

## 2017-01-01 PROCEDURE — 52000 CYSTOURETHROSCOPY: CPT | Performed by: UROLOGY

## 2017-01-01 PROCEDURE — 82945 GLUCOSE OTHER FLUID: CPT

## 2017-01-01 PROCEDURE — 84443 ASSAY THYROID STIM HORMONE: CPT

## 2017-01-01 PROCEDURE — 99284 EMERGENCY DEPT VISIT MOD MDM: CPT

## 2017-01-01 PROCEDURE — 74177 CT ABD & PELVIS W/CONTRAST: CPT

## 2017-01-01 PROCEDURE — 1036F TOBACCO NON-USER: CPT | Performed by: FAMILY MEDICINE

## 2017-01-01 PROCEDURE — 1036F TOBACCO NON-USER: CPT | Performed by: UROLOGY

## 2017-01-01 PROCEDURE — 88341 IMHCHEM/IMCYTCHM EA ADD ANTB: CPT

## 2017-01-01 PROCEDURE — 86900 BLOOD TYPING SEROLOGIC ABO: CPT

## 2017-01-01 RX ORDER — NICOTINE 21 MG/24HR
1 PATCH, TRANSDERMAL 24 HOURS TRANSDERMAL DAILY PRN
Status: DISCONTINUED | OUTPATIENT
Start: 2017-01-01 | End: 2017-01-01 | Stop reason: HOSPADM

## 2017-01-01 RX ORDER — MAGNESIUM SULFATE 1 G/100ML
1 INJECTION INTRAVENOUS PRN
Status: DISCONTINUED | OUTPATIENT
Start: 2017-01-01 | End: 2017-01-01 | Stop reason: HOSPADM

## 2017-01-01 RX ORDER — FUROSEMIDE 10 MG/ML
20 INJECTION INTRAMUSCULAR; INTRAVENOUS DAILY
Status: DISCONTINUED | OUTPATIENT
Start: 2017-01-01 | End: 2017-01-01 | Stop reason: HOSPADM

## 2017-01-01 RX ORDER — MEGESTROL ACETATE 40 MG/1
40 TABLET ORAL DAILY
COMMUNITY
Start: 2017-01-01

## 2017-01-01 RX ORDER — HYDROCODONE BITARTRATE AND ACETAMINOPHEN 5; 325 MG/1; MG/1
1 TABLET ORAL ONCE
Status: COMPLETED | OUTPATIENT
Start: 2017-01-01 | End: 2017-01-01

## 2017-01-01 RX ORDER — METOPROLOL TARTRATE 5 MG/5ML
5 INJECTION INTRAVENOUS EVERY 6 HOURS PRN
Status: DISCONTINUED | OUTPATIENT
Start: 2017-01-01 | End: 2017-01-01 | Stop reason: HOSPADM

## 2017-01-01 RX ORDER — 0.9 % SODIUM CHLORIDE 0.9 %
250 INTRAVENOUS SOLUTION INTRAVENOUS ONCE
Status: DISCONTINUED | OUTPATIENT
Start: 2017-01-01 | End: 2017-01-01 | Stop reason: HOSPADM

## 2017-01-01 RX ORDER — 0.9 % SODIUM CHLORIDE 0.9 %
100 INTRAVENOUS SOLUTION INTRAVENOUS ONCE
Status: COMPLETED | OUTPATIENT
Start: 2017-01-01 | End: 2017-01-01

## 2017-01-01 RX ORDER — DILTIAZEM HYDROCHLORIDE 5 MG/ML
10 INJECTION INTRAVENOUS ONCE
Status: DISCONTINUED | OUTPATIENT
Start: 2017-01-01 | End: 2017-01-01

## 2017-01-01 RX ORDER — MORPHINE SULFATE 2 MG/ML
1 INJECTION, SOLUTION INTRAMUSCULAR; INTRAVENOUS EVERY 6 HOURS PRN
Status: DISCONTINUED | OUTPATIENT
Start: 2017-01-01 | End: 2017-01-01 | Stop reason: HOSPADM

## 2017-01-01 RX ORDER — FERROUS SULFATE 325(65) MG
TABLET ORAL
Qty: 30 TABLET | Refills: 5 | Status: ON HOLD | OUTPATIENT
Start: 2017-01-01 | End: 2017-01-01 | Stop reason: HOSPADM

## 2017-01-01 RX ORDER — ONDANSETRON 2 MG/ML
4 INJECTION INTRAMUSCULAR; INTRAVENOUS ONCE
Status: DISCONTINUED | OUTPATIENT
Start: 2017-01-01 | End: 2017-01-01

## 2017-01-01 RX ORDER — ONDANSETRON 4 MG/1
4 TABLET, ORALLY DISINTEGRATING ORAL EVERY 8 HOURS PRN
Status: ON HOLD | COMMUNITY
End: 2017-01-01 | Stop reason: HOSPADM

## 2017-01-01 RX ORDER — PHYTONADIONE 10 MG/ML
5 INJECTION, EMULSION INTRAMUSCULAR; INTRAVENOUS; SUBCUTANEOUS ONCE
Status: DISCONTINUED | OUTPATIENT
Start: 2017-01-01 | End: 2017-01-01 | Stop reason: HOSPADM

## 2017-01-01 RX ORDER — CALCIUM CARBONATE 500(1250)
500 TABLET ORAL DAILY
COMMUNITY

## 2017-01-01 RX ORDER — LIDOCAINE HYDROCHLORIDE 20 MG/ML
INJECTION, SOLUTION EPIDURAL; INFILTRATION; INTRACAUDAL; PERINEURAL
Status: COMPLETED | OUTPATIENT
Start: 2017-01-01 | End: 2017-01-01

## 2017-01-01 RX ORDER — METRONIDAZOLE 500 MG/1
500 TABLET ORAL ONCE
Status: DISCONTINUED | OUTPATIENT
Start: 2017-01-01 | End: 2017-01-01

## 2017-01-01 RX ORDER — ALBUTEROL SULFATE 2.5 MG/3ML
2.5 SOLUTION RESPIRATORY (INHALATION) EVERY 6 HOURS PRN
Status: DISCONTINUED | OUTPATIENT
Start: 2017-01-01 | End: 2017-01-01

## 2017-01-01 RX ORDER — SODIUM CHLORIDE 0.9 % (FLUSH) 0.9 %
10 SYRINGE (ML) INJECTION EVERY 12 HOURS SCHEDULED
Status: DISCONTINUED | OUTPATIENT
Start: 2017-01-01 | End: 2017-01-01

## 2017-01-01 RX ORDER — 0.9 % SODIUM CHLORIDE 0.9 %
500 INTRAVENOUS SOLUTION INTRAVENOUS ONCE
Status: DISCONTINUED | OUTPATIENT
Start: 2017-01-01 | End: 2017-01-01

## 2017-01-01 RX ORDER — CIPROFLOXACIN HYDROCHLORIDE 3.5 MG/ML
1 SOLUTION/ DROPS TOPICAL
Status: DISCONTINUED | OUTPATIENT
Start: 2017-01-01 | End: 2017-01-01 | Stop reason: HOSPADM

## 2017-01-01 RX ORDER — NITROGLYCERIN 0.4 MG/1
0.4 TABLET SUBLINGUAL EVERY 5 MIN PRN
Status: DISCONTINUED | OUTPATIENT
Start: 2017-01-01 | End: 2017-01-01 | Stop reason: HOSPADM

## 2017-01-01 RX ORDER — FERROUS SULFATE 325(65) MG
325 TABLET ORAL
Status: CANCELLED | OUTPATIENT
Start: 2017-01-01

## 2017-01-01 RX ORDER — DIAZEPAM 2 MG/1
2 TABLET ORAL ONCE
Status: COMPLETED | OUTPATIENT
Start: 2017-01-01 | End: 2017-01-01

## 2017-01-01 RX ORDER — MEGESTROL ACETATE 40 MG/1
40 TABLET ORAL DAILY
Qty: 30 TABLET | Refills: 3 | Status: SHIPPED | OUTPATIENT
Start: 2017-01-01 | End: 2017-01-01

## 2017-01-01 RX ORDER — FUROSEMIDE 20 MG/1
TABLET ORAL
Qty: 45 TABLET | Refills: 2 | Status: ON HOLD | OUTPATIENT
Start: 2017-01-01 | End: 2017-01-01 | Stop reason: HOSPADM

## 2017-01-01 RX ORDER — METOPROLOL TARTRATE 50 MG/1
50 TABLET, FILM COATED ORAL 2 TIMES DAILY
Status: DISCONTINUED | OUTPATIENT
Start: 2017-01-01 | End: 2017-01-01 | Stop reason: HOSPADM

## 2017-01-01 RX ORDER — POTASSIUM CHLORIDE 7.45 MG/ML
10 INJECTION INTRAVENOUS PRN
Status: DISCONTINUED | OUTPATIENT
Start: 2017-01-01 | End: 2017-01-01 | Stop reason: HOSPADM

## 2017-01-01 RX ORDER — ONDANSETRON 2 MG/ML
4 INJECTION INTRAMUSCULAR; INTRAVENOUS EVERY 6 HOURS PRN
Status: DISCONTINUED | OUTPATIENT
Start: 2017-01-01 | End: 2017-01-01 | Stop reason: HOSPADM

## 2017-01-01 RX ORDER — 0.9 % SODIUM CHLORIDE 0.9 %
1000 INTRAVENOUS SOLUTION INTRAVENOUS ONCE
Status: COMPLETED | OUTPATIENT
Start: 2017-01-01 | End: 2017-01-01

## 2017-01-01 RX ORDER — ERGOCALCIFEROL 1.25 MG/1
50000 CAPSULE ORAL WEEKLY
Qty: 12 CAPSULE | Refills: 3 | Status: SHIPPED | OUTPATIENT
Start: 2017-01-01

## 2017-01-01 RX ORDER — DEXAMETHASONE SODIUM PHOSPHATE 4 MG/ML
10 INJECTION, SOLUTION INTRA-ARTICULAR; INTRALESIONAL; INTRAMUSCULAR; INTRAVENOUS; SOFT TISSUE ONCE
Status: DISCONTINUED | OUTPATIENT
Start: 2017-01-01 | End: 2017-01-01 | Stop reason: HOSPADM

## 2017-01-01 RX ORDER — AMLODIPINE BESYLATE 10 MG/1
1 TABLET ORAL DAILY
Status: CANCELLED | OUTPATIENT
Start: 2017-01-01

## 2017-01-01 RX ORDER — DIAZEPAM 2 MG/1
2 TABLET ORAL EVERY 8 HOURS PRN
Qty: 10 TABLET | Refills: 0 | Status: SHIPPED | OUTPATIENT
Start: 2017-01-01 | End: 2017-01-01

## 2017-01-01 RX ORDER — CIPROFLOXACIN 2 MG/ML
400 INJECTION, SOLUTION INTRAVENOUS ONCE
Status: COMPLETED | OUTPATIENT
Start: 2017-01-01 | End: 2017-01-01

## 2017-01-01 RX ORDER — HEPARIN SODIUM 5000 [USP'U]/ML
5000 INJECTION, SOLUTION INTRAVENOUS; SUBCUTANEOUS EVERY 8 HOURS SCHEDULED
Status: DISCONTINUED | OUTPATIENT
Start: 2017-01-01 | End: 2017-01-01

## 2017-01-01 RX ORDER — POTASSIUM CHLORIDE 20 MEQ/1
40 TABLET, EXTENDED RELEASE ORAL PRN
Status: DISCONTINUED | OUTPATIENT
Start: 2017-01-01 | End: 2017-01-01 | Stop reason: HOSPADM

## 2017-01-01 RX ORDER — HEPARIN SODIUM 5000 [USP'U]/ML
5000 INJECTION, SOLUTION INTRAVENOUS; SUBCUTANEOUS EVERY 8 HOURS SCHEDULED
Status: DISCONTINUED | OUTPATIENT
Start: 2017-01-01 | End: 2017-01-01 | Stop reason: HOSPADM

## 2017-01-01 RX ORDER — METOPROLOL SUCCINATE 50 MG/1
TABLET, EXTENDED RELEASE ORAL
Qty: 90 TABLET | Refills: 2 | Status: ON HOLD | OUTPATIENT
Start: 2017-01-01 | End: 2017-01-01 | Stop reason: HOSPADM

## 2017-01-01 RX ORDER — POTASSIUM CHLORIDE 7.45 MG/ML
10 INJECTION INTRAVENOUS ONCE
Status: COMPLETED | OUTPATIENT
Start: 2017-01-01 | End: 2017-01-01

## 2017-01-01 RX ORDER — NITROFURANTOIN 25; 75 MG/1; MG/1
100 CAPSULE ORAL 2 TIMES DAILY
COMMUNITY
End: 2017-01-01

## 2017-01-01 RX ORDER — FENTANYL CITRATE 50 UG/ML
50 INJECTION, SOLUTION INTRAMUSCULAR; INTRAVENOUS ONCE
Status: COMPLETED | OUTPATIENT
Start: 2017-01-01 | End: 2017-01-01

## 2017-01-01 RX ORDER — ACETAMINOPHEN 325 MG/1
650 TABLET ORAL EVERY 4 HOURS PRN
Status: CANCELLED | OUTPATIENT
Start: 2017-01-01

## 2017-01-01 RX ORDER — KETOROLAC TROMETHAMINE 30 MG/ML
30 INJECTION, SOLUTION INTRAMUSCULAR; INTRAVENOUS ONCE
Status: COMPLETED | OUTPATIENT
Start: 2017-01-01 | End: 2017-01-01

## 2017-01-01 RX ORDER — METOPROLOL TARTRATE 5 MG/5ML
5 INJECTION INTRAVENOUS EVERY 6 HOURS
Status: DISCONTINUED | OUTPATIENT
Start: 2017-01-01 | End: 2017-01-01

## 2017-01-01 RX ORDER — MEGESTROL ACETATE 40 MG/1
40 TABLET ORAL DAILY
Status: DISCONTINUED | OUTPATIENT
Start: 2017-01-01 | End: 2017-01-01 | Stop reason: HOSPADM

## 2017-01-01 RX ORDER — POTASSIUM CHLORIDE 20MEQ/15ML
40 LIQUID (ML) ORAL PRN
Status: DISCONTINUED | OUTPATIENT
Start: 2017-01-01 | End: 2017-01-01 | Stop reason: HOSPADM

## 2017-01-01 RX ORDER — SPIRONOLACTONE 25 MG/1
25 TABLET ORAL DAILY
Status: DISCONTINUED | OUTPATIENT
Start: 2017-01-01 | End: 2017-01-01 | Stop reason: HOSPADM

## 2017-01-01 RX ORDER — ALBUTEROL SULFATE 2.5 MG/3ML
2.5 SOLUTION RESPIRATORY (INHALATION) ONCE
Status: COMPLETED | OUTPATIENT
Start: 2017-01-01 | End: 2017-01-01

## 2017-01-01 RX ORDER — ERGOCALCIFEROL 1.25 MG/1
CAPSULE ORAL
COMMUNITY
Start: 2017-01-01 | End: 2017-01-01 | Stop reason: SDUPTHER

## 2017-01-01 RX ORDER — SODIUM CHLORIDE 0.9 % (FLUSH) 0.9 %
10 SYRINGE (ML) INJECTION PRN
Status: DISCONTINUED | OUTPATIENT
Start: 2017-01-01 | End: 2017-01-01 | Stop reason: HOSPADM

## 2017-01-01 RX ORDER — POTASSIUM CHLORIDE 20 MEQ/1
20 TABLET, EXTENDED RELEASE ORAL DAILY
Qty: 30 TABLET | Refills: 11 | Status: ON HOLD | OUTPATIENT
Start: 2017-01-01 | End: 2017-01-01 | Stop reason: HOSPADM

## 2017-01-01 RX ORDER — POLYETHYLENE GLYCOL 3350 17 G/17G
17 POWDER, FOR SOLUTION ORAL DAILY
Status: DISCONTINUED | OUTPATIENT
Start: 2017-01-01 | End: 2017-01-01 | Stop reason: HOSPADM

## 2017-01-01 RX ORDER — METOPROLOL SUCCINATE 50 MG/1
1 TABLET, EXTENDED RELEASE ORAL DAILY
Status: CANCELLED | OUTPATIENT
Start: 2017-01-01

## 2017-01-01 RX ORDER — CIPROFLOXACIN 500 MG/1
500 TABLET, FILM COATED ORAL 2 TIMES DAILY
COMMUNITY
End: 2017-01-01

## 2017-01-01 RX ORDER — DEXAMETHASONE SODIUM PHOSPHATE 4 MG/ML
10 INJECTION, SOLUTION INTRA-ARTICULAR; INTRALESIONAL; INTRAMUSCULAR; INTRAVENOUS; SOFT TISSUE ONCE
Status: COMPLETED | OUTPATIENT
Start: 2017-01-01 | End: 2017-01-01

## 2017-01-01 RX ORDER — ALPRAZOLAM 0.25 MG/1
0.25 TABLET ORAL EVERY 6 HOURS PRN
Status: DISCONTINUED | OUTPATIENT
Start: 2017-01-01 | End: 2017-01-01 | Stop reason: HOSPADM

## 2017-01-01 RX ORDER — ACETAMINOPHEN 325 MG/1
650 TABLET ORAL EVERY 4 HOURS PRN
Status: DISCONTINUED | OUTPATIENT
Start: 2017-01-01 | End: 2017-01-01 | Stop reason: HOSPADM

## 2017-01-01 RX ORDER — ALPRAZOLAM 0.25 MG/1
0.25 TABLET ORAL EVERY 6 HOURS PRN
Status: ON HOLD | COMMUNITY
End: 2017-01-01 | Stop reason: HOSPADM

## 2017-01-01 RX ORDER — FUROSEMIDE 20 MG/1
20 TABLET ORAL DAILY
Qty: 60 TABLET | Refills: 3 | Status: SHIPPED | OUTPATIENT
Start: 2017-01-01

## 2017-01-01 RX ORDER — HYDROCODONE BITARTRATE AND ACETAMINOPHEN 5; 325 MG/1; MG/1
1 TABLET ORAL NIGHTLY PRN
Qty: 7 TABLET | Refills: 0 | Status: SHIPPED | OUTPATIENT
Start: 2017-01-01 | End: 2017-01-01

## 2017-01-01 RX ORDER — FERROUS SULFATE 325(65) MG
TABLET ORAL
COMMUNITY
Start: 2017-01-01

## 2017-01-01 RX ORDER — ACETAMINOPHEN 325 MG/1
650 TABLET ORAL EVERY 4 HOURS PRN
Qty: 120 TABLET | Refills: 3 | Status: SHIPPED | OUTPATIENT
Start: 2017-01-01

## 2017-01-01 RX ORDER — BISACODYL 10 MG
10 SUPPOSITORY, RECTAL RECTAL DAILY PRN
Status: DISCONTINUED | OUTPATIENT
Start: 2017-01-01 | End: 2017-01-01 | Stop reason: HOSPADM

## 2017-01-01 RX ORDER — SODIUM CHLORIDE 0.9 % (FLUSH) 0.9 %
10 SYRINGE (ML) INJECTION PRN
Status: DISCONTINUED | OUTPATIENT
Start: 2017-01-01 | End: 2017-01-01

## 2017-01-01 RX ORDER — SODIUM CHLORIDE 0.9 % (FLUSH) 0.9 %
10 SYRINGE (ML) INJECTION EVERY 12 HOURS SCHEDULED
Status: DISCONTINUED | OUTPATIENT
Start: 2017-01-01 | End: 2017-01-01 | Stop reason: HOSPADM

## 2017-01-01 RX ORDER — DEXTROSE AND SODIUM CHLORIDE 5; .9 G/100ML; G/100ML
INJECTION, SOLUTION INTRAVENOUS CONTINUOUS
Status: DISCONTINUED | OUTPATIENT
Start: 2017-01-01 | End: 2017-01-01 | Stop reason: HOSPADM

## 2017-01-01 RX ORDER — POTASSIUM CHLORIDE 20 MEQ/1
20 TABLET, EXTENDED RELEASE ORAL 2 TIMES DAILY WITH MEALS
Status: DISCONTINUED | OUTPATIENT
Start: 2017-01-01 | End: 2017-01-01 | Stop reason: HOSPADM

## 2017-01-01 RX ORDER — DOCUSATE SODIUM 100 MG/1
100 CAPSULE, LIQUID FILLED ORAL 2 TIMES DAILY
Status: DISCONTINUED | OUTPATIENT
Start: 2017-01-01 | End: 2017-01-01 | Stop reason: HOSPADM

## 2017-01-01 RX ORDER — IBUPROFEN 600 MG/1
600 TABLET ORAL EVERY 6 HOURS PRN
Qty: 30 TABLET | Refills: 0 | Status: SHIPPED | OUTPATIENT
Start: 2017-01-01 | End: 2017-01-01

## 2017-01-01 RX ORDER — DEXTROSE MONOHYDRATE 100 MG/ML
INJECTION, SOLUTION INTRAVENOUS CONTINUOUS
Status: CANCELLED | OUTPATIENT
Start: 2017-01-01

## 2017-01-01 RX ORDER — HYDROCODONE BITARTRATE AND ACETAMINOPHEN 5; 325 MG/1; MG/1
1 TABLET ORAL EVERY 6 HOURS PRN
Status: ON HOLD | COMMUNITY
End: 2017-01-01 | Stop reason: HOSPADM

## 2017-01-01 RX ORDER — CYCLOBENZAPRINE HCL 5 MG
5 TABLET ORAL DAILY
Status: ON HOLD | COMMUNITY
End: 2017-01-01 | Stop reason: HOSPADM

## 2017-01-01 RX ORDER — POTASSIUM CHLORIDE 20 MEQ/1
TABLET, EXTENDED RELEASE ORAL
Qty: 30 TABLET | Refills: 3 | Status: ON HOLD | OUTPATIENT
Start: 2017-01-01 | End: 2017-01-01 | Stop reason: HOSPADM

## 2017-01-01 RX ORDER — SODIUM CHLORIDE 9 MG/ML
INJECTION, SOLUTION INTRAVENOUS CONTINUOUS
Status: DISCONTINUED | OUTPATIENT
Start: 2017-01-01 | End: 2017-01-01

## 2017-01-01 RX ADMIN — POTASSIUM CHLORIDE 20 MEQ: 20 TABLET, EXTENDED RELEASE ORAL at 07:46

## 2017-01-01 RX ADMIN — KETOROLAC TROMETHAMINE 30 MG: 30 INJECTION, SOLUTION INTRAMUSCULAR; INTRAVENOUS at 16:42

## 2017-01-01 RX ADMIN — CIPROFLOXACIN HYDROCHLORIDE 1 DROP: 3 SOLUTION/ DROPS OPHTHALMIC at 22:16

## 2017-01-01 RX ADMIN — DOCUSATE SODIUM 100 MG: 100 CAPSULE, LIQUID FILLED ORAL at 08:22

## 2017-01-01 RX ADMIN — DEXTROSE AND SODIUM CHLORIDE: 5; 900 INJECTION, SOLUTION INTRAVENOUS at 20:13

## 2017-01-01 RX ADMIN — CIPROFLOXACIN HYDROCHLORIDE 1 DROP: 3 SOLUTION/ DROPS OPHTHALMIC at 20:13

## 2017-01-01 RX ADMIN — FUROSEMIDE 20 MG: 10 INJECTION, SOLUTION INTRAVENOUS at 09:40

## 2017-01-01 RX ADMIN — POTASSIUM CHLORIDE 10 MEQ: 7.46 INJECTION, SOLUTION INTRAVENOUS at 22:10

## 2017-01-01 RX ADMIN — METOPROLOL TARTRATE 25 MG: 25 TABLET ORAL at 09:14

## 2017-01-01 RX ADMIN — HYDROCODONE BITARTRATE AND ACETAMINOPHEN 1 TABLET: 5; 325 TABLET ORAL at 12:09

## 2017-01-01 RX ADMIN — CIPROFLOXACIN HYDROCHLORIDE 1 DROP: 3 SOLUTION/ DROPS OPHTHALMIC at 09:38

## 2017-01-01 RX ADMIN — DEXTROSE AND SODIUM CHLORIDE: 5; 900 INJECTION, SOLUTION INTRAVENOUS at 10:13

## 2017-01-01 RX ADMIN — FUROSEMIDE 20 MG: 10 INJECTION, SOLUTION INTRAVENOUS at 08:22

## 2017-01-01 RX ADMIN — CIPROFLOXACIN HYDROCHLORIDE 1 DROP: 3 SOLUTION/ DROPS OPHTHALMIC at 09:40

## 2017-01-01 RX ADMIN — POTASSIUM CHLORIDE 10 MEQ: 7.46 INJECTION, SOLUTION INTRAVENOUS at 09:17

## 2017-01-01 RX ADMIN — CIPROFLOXACIN HYDROCHLORIDE 1 DROP: 3 SOLUTION/ DROPS OPHTHALMIC at 14:25

## 2017-01-01 RX ADMIN — Medication 10 ML: at 22:10

## 2017-01-01 RX ADMIN — DIAZEPAM 2 MG: 2 TABLET ORAL at 12:09

## 2017-01-01 RX ADMIN — HEPARIN SODIUM 5000 UNITS: 5000 INJECTION, SOLUTION INTRAVENOUS; SUBCUTANEOUS at 18:16

## 2017-01-01 RX ADMIN — HEPARIN SODIUM 5000 UNITS: 5000 INJECTION, SOLUTION INTRAVENOUS; SUBCUTANEOUS at 15:46

## 2017-01-01 RX ADMIN — FUROSEMIDE 20 MG: 10 INJECTION, SOLUTION INTRAVENOUS at 09:36

## 2017-01-01 RX ADMIN — FENTANYL CITRATE 25 MCG: 50 INJECTION INTRAMUSCULAR; INTRAVENOUS at 16:52

## 2017-01-01 RX ADMIN — HEPARIN SODIUM 5000 UNITS: 5000 INJECTION, SOLUTION INTRAVENOUS; SUBCUTANEOUS at 13:31

## 2017-01-01 RX ADMIN — METOPROLOL TARTRATE 25 MG: 25 TABLET ORAL at 07:46

## 2017-01-01 RX ADMIN — METOPROLOL TARTRATE 5 MG: 5 INJECTION INTRAVENOUS at 08:20

## 2017-01-01 RX ADMIN — CIPROFLOXACIN HYDROCHLORIDE 1 DROP: 3 SOLUTION/ DROPS OPHTHALMIC at 05:59

## 2017-01-01 RX ADMIN — POTASSIUM CHLORIDE 20 MEQ: 20 TABLET, EXTENDED RELEASE ORAL at 18:14

## 2017-01-01 RX ADMIN — DOCUSATE SODIUM 100 MG: 100 CAPSULE, LIQUID FILLED ORAL at 20:56

## 2017-01-01 RX ADMIN — METOPROLOL TARTRATE 50 MG: 50 TABLET ORAL at 08:22

## 2017-01-01 RX ADMIN — SPIRONOLACTONE 25 MG: 25 TABLET, FILM COATED ORAL at 08:22

## 2017-01-01 RX ADMIN — Medication 10 ML: at 09:41

## 2017-01-01 RX ADMIN — POTASSIUM CHLORIDE 10 MEQ: 7.46 INJECTION, SOLUTION INTRAVENOUS at 14:17

## 2017-01-01 RX ADMIN — HEPARIN SODIUM 5000 UNITS: 5000 INJECTION, SOLUTION INTRAVENOUS; SUBCUTANEOUS at 06:22

## 2017-01-01 RX ADMIN — FAMOTIDINE 20 MG: 10 INJECTION, SOLUTION INTRAVENOUS at 08:33

## 2017-01-01 RX ADMIN — CIPROFLOXACIN HYDROCHLORIDE 1 DROP: 3 SOLUTION/ DROPS OPHTHALMIC at 08:20

## 2017-01-01 RX ADMIN — HEPARIN SODIUM 5000 UNITS: 5000 INJECTION, SOLUTION INTRAVENOUS; SUBCUTANEOUS at 22:30

## 2017-01-01 RX ADMIN — METOPROLOL TARTRATE 50 MG: 50 TABLET ORAL at 09:36

## 2017-01-01 RX ADMIN — POLYETHYLENE GLYCOL 3350 17 G: 17 POWDER, FOR SOLUTION ORAL at 08:33

## 2017-01-01 RX ADMIN — MORPHINE SULFATE 1 MG: 2 INJECTION, SOLUTION INTRAMUSCULAR; INTRAVENOUS at 14:32

## 2017-01-01 RX ADMIN — DOCUSATE SODIUM 100 MG: 100 CAPSULE, LIQUID FILLED ORAL at 22:10

## 2017-01-01 RX ADMIN — METOPROLOL TARTRATE 50 MG: 50 TABLET ORAL at 20:35

## 2017-01-01 RX ADMIN — ACETAMINOPHEN 650 MG: 325 TABLET ORAL at 09:57

## 2017-01-01 RX ADMIN — CIPROFLOXACIN HYDROCHLORIDE 1 DROP: 3 SOLUTION/ DROPS OPHTHALMIC at 09:57

## 2017-01-01 RX ADMIN — DEXTROSE AND SODIUM CHLORIDE: 5; 900 INJECTION, SOLUTION INTRAVENOUS at 23:59

## 2017-01-01 RX ADMIN — ENOXAPARIN SODIUM 50 MG: 60 INJECTION SUBCUTANEOUS at 09:59

## 2017-01-01 RX ADMIN — CIPROFLOXACIN HYDROCHLORIDE 1 DROP: 3 SOLUTION/ DROPS OPHTHALMIC at 22:31

## 2017-01-01 RX ADMIN — DEXTROSE AND SODIUM CHLORIDE: 5; 900 INJECTION, SOLUTION INTRAVENOUS at 01:48

## 2017-01-01 RX ADMIN — METOPROLOL TARTRATE 50 MG: 50 TABLET ORAL at 09:40

## 2017-01-01 RX ADMIN — CIPROFLOXACIN HYDROCHLORIDE 1 DROP: 3 SOLUTION/ DROPS OPHTHALMIC at 07:45

## 2017-01-01 RX ADMIN — CIPROFLOXACIN HYDROCHLORIDE 1 DROP: 3 SOLUTION/ DROPS OPHTHALMIC at 10:01

## 2017-01-01 RX ADMIN — ACETAMINOPHEN 650 MG: 325 TABLET ORAL at 08:33

## 2017-01-01 RX ADMIN — CIPROFLOXACIN HYDROCHLORIDE 1 DROP: 3 SOLUTION/ DROPS OPHTHALMIC at 19:39

## 2017-01-01 RX ADMIN — DOCUSATE SODIUM 100 MG: 100 CAPSULE, LIQUID FILLED ORAL at 20:35

## 2017-01-01 RX ADMIN — DEXTROSE AND SODIUM CHLORIDE: 5; 900 INJECTION, SOLUTION INTRAVENOUS at 11:16

## 2017-01-01 RX ADMIN — CIPROFLOXACIN HYDROCHLORIDE 1 DROP: 3 SOLUTION/ DROPS OPHTHALMIC at 15:59

## 2017-01-01 RX ADMIN — CIPROFLOXACIN 400 MG: 2 INJECTION, SOLUTION INTRAVENOUS at 18:30

## 2017-01-01 RX ADMIN — FAMOTIDINE 20 MG: 10 INJECTION, SOLUTION INTRAVENOUS at 09:59

## 2017-01-01 RX ADMIN — DEXAMETHASONE SODIUM PHOSPHATE 10 MG: 4 INJECTION, SOLUTION INTRAMUSCULAR; INTRAVENOUS at 09:17

## 2017-01-01 RX ADMIN — HEPARIN SODIUM 5000 UNITS: 5000 INJECTION, SOLUTION INTRAVENOUS; SUBCUTANEOUS at 15:01

## 2017-01-01 RX ADMIN — HYDROCODONE BITARTRATE AND ACETAMINOPHEN 1 TABLET: 5; 325 TABLET ORAL at 15:53

## 2017-01-01 RX ADMIN — HEPARIN SODIUM 5000 UNITS: 5000 INJECTION, SOLUTION INTRAVENOUS; SUBCUTANEOUS at 21:55

## 2017-01-01 RX ADMIN — POTASSIUM CHLORIDE 10 MEQ: 7.46 INJECTION, SOLUTION INTRAVENOUS at 11:14

## 2017-01-01 RX ADMIN — SPIRONOLACTONE 25 MG: 25 TABLET, FILM COATED ORAL at 09:40

## 2017-01-01 RX ADMIN — INFLUENZA VIRUS VACCINE 0.5 ML: 15; 15; 15; 15 SUSPENSION INTRAMUSCULAR at 15:47

## 2017-01-01 RX ADMIN — MEGESTROL ACETATE 40 MG: 40 TABLET ORAL at 09:41

## 2017-01-01 RX ADMIN — CIPROFLOXACIN HYDROCHLORIDE 1 DROP: 3 SOLUTION/ DROPS OPHTHALMIC at 17:38

## 2017-01-01 RX ADMIN — Medication 10 ML: at 20:17

## 2017-01-01 RX ADMIN — POTASSIUM CHLORIDE 10 MEQ: 7.46 INJECTION, SOLUTION INTRAVENOUS at 16:18

## 2017-01-01 RX ADMIN — CIPROFLOXACIN HYDROCHLORIDE 1 DROP: 3 SOLUTION/ DROPS OPHTHALMIC at 19:09

## 2017-01-01 RX ADMIN — POTASSIUM CHLORIDE 20 MEQ: 20 TABLET, EXTENDED RELEASE ORAL at 19:38

## 2017-01-01 RX ADMIN — SPIRONOLACTONE 25 MG: 25 TABLET, FILM COATED ORAL at 18:16

## 2017-01-01 RX ADMIN — METOPROLOL TARTRATE 25 MG: 25 TABLET ORAL at 20:13

## 2017-01-01 RX ADMIN — DEXTROSE AND SODIUM CHLORIDE: 5; 900 INJECTION, SOLUTION INTRAVENOUS at 04:42

## 2017-01-01 RX ADMIN — CIPROFLOXACIN HYDROCHLORIDE 1 DROP: 3 SOLUTION/ DROPS OPHTHALMIC at 21:57

## 2017-01-01 RX ADMIN — CIPROFLOXACIN HYDROCHLORIDE 1 DROP: 3 SOLUTION/ DROPS OPHTHALMIC at 14:35

## 2017-01-01 RX ADMIN — ALPRAZOLAM 0.25 MG: 0.25 TABLET ORAL at 15:11

## 2017-01-01 RX ADMIN — DOCUSATE SODIUM 100 MG: 100 CAPSULE, LIQUID FILLED ORAL at 09:36

## 2017-01-01 RX ADMIN — SODIUM CHLORIDE 100 ML: 9 INJECTION, SOLUTION INTRAVENOUS at 21:17

## 2017-01-01 RX ADMIN — CIPROFLOXACIN HYDROCHLORIDE 1 DROP: 3 SOLUTION/ DROPS OPHTHALMIC at 09:27

## 2017-01-01 RX ADMIN — CIPROFLOXACIN HYDROCHLORIDE 1 DROP: 3 SOLUTION/ DROPS OPHTHALMIC at 11:59

## 2017-01-01 RX ADMIN — CIPROFLOXACIN HYDROCHLORIDE 1 DROP: 3 SOLUTION/ DROPS OPHTHALMIC at 13:20

## 2017-01-01 RX ADMIN — DEXTROSE AND SODIUM CHLORIDE: 5; 900 INJECTION, SOLUTION INTRAVENOUS at 14:14

## 2017-01-01 RX ADMIN — CIPROFLOXACIN HYDROCHLORIDE 1 DROP: 3 SOLUTION/ DROPS OPHTHALMIC at 14:14

## 2017-01-01 RX ADMIN — CIPROFLOXACIN HYDROCHLORIDE 1 DROP: 3 SOLUTION/ DROPS OPHTHALMIC at 12:16

## 2017-01-01 RX ADMIN — DOCUSATE SODIUM 100 MG: 100 CAPSULE, LIQUID FILLED ORAL at 09:40

## 2017-01-01 RX ADMIN — CIPROFLOXACIN HYDROCHLORIDE 1 DROP: 3 SOLUTION/ DROPS OPHTHALMIC at 21:56

## 2017-01-01 RX ADMIN — POLYETHYLENE GLYCOL 3350 17 G: 17 POWDER, FOR SOLUTION ORAL at 09:58

## 2017-01-01 RX ADMIN — SPIRONOLACTONE 25 MG: 25 TABLET, FILM COATED ORAL at 09:36

## 2017-01-01 RX ADMIN — POTASSIUM CHLORIDE 20 MEQ: 20 TABLET, EXTENDED RELEASE ORAL at 17:38

## 2017-01-01 RX ADMIN — Medication 10 ML: at 20:36

## 2017-01-01 RX ADMIN — DEXTROSE AND SODIUM CHLORIDE: 5; 900 INJECTION, SOLUTION INTRAVENOUS at 05:34

## 2017-01-01 RX ADMIN — MEGESTROL ACETATE 40 MG: 40 TABLET ORAL at 08:23

## 2017-01-01 RX ADMIN — CIPROFLOXACIN HYDROCHLORIDE 1 DROP: 3 SOLUTION/ DROPS OPHTHALMIC at 18:03

## 2017-01-01 RX ADMIN — CIPROFLOXACIN HYDROCHLORIDE 1 DROP: 3 SOLUTION/ DROPS OPHTHALMIC at 18:13

## 2017-01-01 RX ADMIN — POTASSIUM CHLORIDE 10 MEQ: 7.46 INJECTION, SOLUTION INTRAVENOUS at 07:45

## 2017-01-01 RX ADMIN — FUROSEMIDE 20 MG: 10 INJECTION, SOLUTION INTRAVENOUS at 18:16

## 2017-01-01 RX ADMIN — METOPROLOL TARTRATE 25 MG: 25 TABLET ORAL at 09:38

## 2017-01-01 RX ADMIN — CIPROFLOXACIN HYDROCHLORIDE 1 DROP: 3 SOLUTION/ DROPS OPHTHALMIC at 04:52

## 2017-01-01 RX ADMIN — CIPROFLOXACIN HYDROCHLORIDE 1 DROP: 3 SOLUTION/ DROPS OPHTHALMIC at 16:16

## 2017-01-01 RX ADMIN — HEPARIN SODIUM 5000 UNITS: 5000 INJECTION, SOLUTION INTRAVENOUS; SUBCUTANEOUS at 20:55

## 2017-01-01 RX ADMIN — CALCIUM GLUCONATE 1 G: 94 INJECTION, SOLUTION INTRAVENOUS at 20:50

## 2017-01-01 RX ADMIN — Medication 10 ML: at 08:22

## 2017-01-01 RX ADMIN — CIPROFLOXACIN HYDROCHLORIDE 1 DROP: 3 SOLUTION/ DROPS OPHTHALMIC at 20:04

## 2017-01-01 RX ADMIN — CIPROFLOXACIN HYDROCHLORIDE 1 DROP: 3 SOLUTION/ DROPS OPHTHALMIC at 05:38

## 2017-01-01 RX ADMIN — DEXTROSE AND SODIUM CHLORIDE: 5; 900 INJECTION, SOLUTION INTRAVENOUS at 22:30

## 2017-01-01 RX ADMIN — ALBUTEROL SULFATE 2.5 MG: 2.5 SOLUTION RESPIRATORY (INHALATION) at 13:38

## 2017-01-01 RX ADMIN — METOPROLOL TARTRATE 25 MG: 25 TABLET ORAL at 21:56

## 2017-01-01 RX ADMIN — Medication 10 ML: at 09:36

## 2017-01-01 RX ADMIN — SODIUM CHLORIDE 1000 ML: 9 INJECTION, SOLUTION INTRAVENOUS at 19:50

## 2017-01-01 RX ADMIN — MEGESTROL ACETATE 40 MG: 40 TABLET ORAL at 09:36

## 2017-01-01 RX ADMIN — METOPROLOL TARTRATE 50 MG: 50 TABLET ORAL at 22:10

## 2017-01-01 RX ADMIN — HEPARIN SODIUM 5000 UNITS: 5000 INJECTION, SOLUTION INTRAVENOUS; SUBCUTANEOUS at 07:19

## 2017-01-01 RX ADMIN — HEPARIN SODIUM 5000 UNITS: 5000 INJECTION, SOLUTION INTRAVENOUS; SUBCUTANEOUS at 06:38

## 2017-01-01 RX ADMIN — HYDROCODONE BITARTRATE AND ACETAMINOPHEN 1 TABLET: 5; 325 TABLET ORAL at 21:54

## 2017-01-01 RX ADMIN — CIPROFLOXACIN HYDROCHLORIDE 1 DROP: 3 SOLUTION/ DROPS OPHTHALMIC at 16:53

## 2017-01-01 RX ADMIN — CIPROFLOXACIN HYDROCHLORIDE 1 DROP: 3 SOLUTION/ DROPS OPHTHALMIC at 12:14

## 2017-01-01 RX ADMIN — CIPROFLOXACIN HYDROCHLORIDE 1 DROP: 3 SOLUTION/ DROPS OPHTHALMIC at 06:22

## 2017-01-01 RX ADMIN — LIDOCAINE HYDROCHLORIDE 2 ML: 20 INJECTION, SOLUTION EPIDURAL; INFILTRATION; INTRACAUDAL; PERINEURAL at 10:39

## 2017-01-01 RX ADMIN — POTASSIUM CHLORIDE 20 MEQ: 20 TABLET, EXTENDED RELEASE ORAL at 09:38

## 2017-01-01 RX ADMIN — HEPARIN SODIUM 5000 UNITS: 5000 INJECTION, SOLUTION INTRAVENOUS; SUBCUTANEOUS at 23:04

## 2017-01-01 RX ADMIN — Medication 10 ML: at 20:56

## 2017-01-01 RX ADMIN — CIPROFLOXACIN HYDROCHLORIDE 1 DROP: 3 SOLUTION/ DROPS OPHTHALMIC at 12:13

## 2017-01-01 RX ADMIN — IOVERSOL 130 ML: 741 INJECTION INTRA-ARTERIAL; INTRAVENOUS at 20:16

## 2017-01-01 RX ADMIN — MORPHINE SULFATE 1 MG: 2 INJECTION, SOLUTION INTRAMUSCULAR; INTRAVENOUS at 14:26

## 2017-01-01 RX ADMIN — HEPARIN SODIUM 5000 UNITS: 5000 INJECTION, SOLUTION INTRAVENOUS; SUBCUTANEOUS at 06:05

## 2017-01-01 RX ADMIN — METOPROLOL TARTRATE 25 MG: 25 TABLET ORAL at 13:20

## 2017-01-01 RX ADMIN — METOPROLOL TARTRATE 50 MG: 50 TABLET ORAL at 20:56

## 2017-01-01 ASSESSMENT — ENCOUNTER SYMPTOMS
CHEST TIGHTNESS: 0
NAUSEA: 0
RHINORRHEA: 0
PHOTOPHOBIA: 0
ORTHOPNEA: 1
ABDOMINAL PAIN: 0
CONSTIPATION: 0
COUGH: 0
ANAL BLEEDING: 0
VOICE CHANGE: 0
COUGH: 0
VOMITING: 0
ANAL BLEEDING: 0
RHINORRHEA: 0
COUGH: 0
SORE THROAT: 0
VOMITING: 0
NAUSEA: 0
EYE DISCHARGE: 0
EYE REDNESS: 0
VOMITING: 0
ABDOMINAL PAIN: 0
ORTHOPNEA: 0
BLOOD IN STOOL: 0
DIARRHEA: 0
APNEA: 0
VOMITING: 0
BACK PAIN: 0
SHORTNESS OF BREATH: 1
COUGH: 0
CONSTIPATION: 0
RECTAL PAIN: 0
SHORTNESS OF BREATH: 1
SHORTNESS OF BREATH: 0
STRIDOR: 0
EYE PAIN: 0
EYE REDNESS: 0
ABDOMINAL PAIN: 0
EYE REDNESS: 1
DIARRHEA: 0
DIARRHEA: 0
ABDOMINAL PAIN: 0
NAUSEA: 0
TROUBLE SWALLOWING: 0
APNEA: 0
PHOTOPHOBIA: 0
FACIAL SWELLING: 0
COUGH: 0
ABDOMINAL PAIN: 0
BACK PAIN: 0
VOMITING: 0
CHOKING: 0
NAUSEA: 0
SORE THROAT: 0
BACK PAIN: 0
DIARRHEA: 0
ABDOMINAL DISTENTION: 0
EYE DISCHARGE: 0
VOMITING: 0
COLOR CHANGE: 0
RECTAL PAIN: 0
FACIAL SWELLING: 0
EYE ITCHING: 0
SPUTUM PRODUCTION: 0
CONSTIPATION: 0
EYE DISCHARGE: 0
VOMITING: 1
RHINORRHEA: 0
BLOOD IN STOOL: 0
STRIDOR: 0
WHEEZING: 0
VOMITING: 0
VOMITING: 0
ABDOMINAL PAIN: 0
ABDOMINAL PAIN: 0
WHEEZING: 0
STRIDOR: 0
NAUSEA: 0
SINUS PRESSURE: 0
STRIDOR: 0
DIARRHEA: 0
CONSTIPATION: 0
SINUS PRESSURE: 0
BLOOD IN STOOL: 0
PHOTOPHOBIA: 0
VOICE CHANGE: 0
ABDOMINAL PAIN: 1
SINUS PRESSURE: 0
COUGH: 0
CHEST TIGHTNESS: 0
EYE ITCHING: 0
BACK PAIN: 1
COLOR CHANGE: 0
EYE PAIN: 0
SORE THROAT: 0
ABDOMINAL PAIN: 0
RECTAL PAIN: 0
STRIDOR: 0
WHEEZING: 0
BLOOD IN STOOL: 0
SHORTNESS OF BREATH: 0
RHINORRHEA: 0
EYE REDNESS: 0
RHINORRHEA: 0
TROUBLE SWALLOWING: 0
NAUSEA: 0
SHORTNESS OF BREATH: 1
SHORTNESS OF BREATH: 0
SINUS PRESSURE: 0
RHINORRHEA: 0
ABDOMINAL PAIN: 0
ABDOMINAL DISTENTION: 0
SINUS PRESSURE: 0
HEMOPTYSIS: 0
EYE PAIN: 0
SORE THROAT: 0
CHEST TIGHTNESS: 0
EYE ITCHING: 1
CONSTIPATION: 0
NAUSEA: 0
EYE ITCHING: 0
COLOR CHANGE: 0
VOICE CHANGE: 0
BACK PAIN: 0
SHORTNESS OF BREATH: 1
APNEA: 0
EYE REDNESS: 0
ANAL BLEEDING: 0
ABDOMINAL DISTENTION: 0
DIARRHEA: 0
SORE THROAT: 0
ABDOMINAL DISTENTION: 0
RECTAL PAIN: 0
DIARRHEA: 0
CONSTIPATION: 0
SHORTNESS OF BREATH: 0
ANAL BLEEDING: 0
DIARRHEA: 0
ABDOMINAL PAIN: 0
EYE PAIN: 0
CHOKING: 0
COUGH: 0
EYE PAIN: 0
SHORTNESS OF BREATH: 0
VOMITING: 0
CHOKING: 0
NAUSEA: 0
EYE REDNESS: 0
WHEEZING: 0
EYE DISCHARGE: 0
CHEST TIGHTNESS: 0
EYE ITCHING: 0
CHOKING: 0
TROUBLE SWALLOWING: 0
WHEEZING: 0
APNEA: 0
ABDOMINAL PAIN: 0
TROUBLE SWALLOWING: 0
PHOTOPHOBIA: 0
CHEST TIGHTNESS: 0
ORTHOPNEA: 0
DIARRHEA: 0
SHORTNESS OF BREATH: 0
BACK PAIN: 0
VOICE CHANGE: 0
VOMITING: 0
FACIAL SWELLING: 0
NAUSEA: 0
DIARRHEA: 0
SHORTNESS OF BREATH: 1
NAUSEA: 0
DIARRHEA: 0
COUGH: 0
WHEEZING: 0
CONSTIPATION: 0
WHEEZING: 0
ABDOMINAL DISTENTION: 0
COUGH: 0
COUGH: 0
NAUSEA: 1
COLOR CHANGE: 0
COUGH: 0
SORE THROAT: 0
FACIAL SWELLING: 0
VOMITING: 0
RHINORRHEA: 0
WHEEZING: 0

## 2017-01-01 ASSESSMENT — PAIN SCALES - GENERAL
PAINLEVEL_OUTOF10: 3
PAINLEVEL_OUTOF10: 9
PAINLEVEL_OUTOF10: 0
PAINLEVEL_OUTOF10: 5
PAINLEVEL_OUTOF10: 10
PAINLEVEL_OUTOF10: 3
PAINLEVEL_OUTOF10: 1
PAINLEVEL_OUTOF10: 5
PAINLEVEL_OUTOF10: 5
PAINLEVEL_OUTOF10: 7
PAINLEVEL_OUTOF10: 0
PAINLEVEL_OUTOF10: 10
PAINLEVEL_OUTOF10: 0
PAINLEVEL_OUTOF10: 0
PAINLEVEL_OUTOF10: 5
PAINLEVEL_OUTOF10: 3
PAINLEVEL_OUTOF10: 0
PAINLEVEL_OUTOF10: 2
PAINLEVEL_OUTOF10: 8
PAINLEVEL_OUTOF10: 2
PAINLEVEL_OUTOF10: 0
PAINLEVEL_OUTOF10: 3
PAINLEVEL_OUTOF10: 10
PAINLEVEL_OUTOF10: 0
PAINLEVEL_OUTOF10: 4
PAINLEVEL_OUTOF10: 7

## 2017-01-01 ASSESSMENT — PAIN DESCRIPTION - ORIENTATION
ORIENTATION: RIGHT
ORIENTATION: LOWER
ORIENTATION: RIGHT

## 2017-01-01 ASSESSMENT — PAIN DESCRIPTION - FREQUENCY
FREQUENCY: CONTINUOUS
FREQUENCY: CONTINUOUS
FREQUENCY: INTERMITTENT

## 2017-01-01 ASSESSMENT — PAIN SCALES - WONG BAKER: WONGBAKER_NUMERICALRESPONSE: 4

## 2017-01-01 ASSESSMENT — PAIN DESCRIPTION - PAIN TYPE
TYPE: ACUTE PAIN
TYPE: ACUTE PAIN
TYPE: CHRONIC PAIN
TYPE: ACUTE PAIN
TYPE: ACUTE PAIN

## 2017-01-01 ASSESSMENT — PAIN DESCRIPTION - LOCATION
LOCATION: BACK
LOCATION: ABDOMEN
LOCATION: ABDOMEN
LOCATION: BUTTOCKS;HIP
LOCATION: COCCYX
LOCATION: LEG;HIP

## 2017-01-01 ASSESSMENT — PAIN DESCRIPTION - DESCRIPTORS
DESCRIPTORS: CONSTANT;ACHING
DESCRIPTORS: ACHING
DESCRIPTORS: CONSTANT;ACHING

## 2017-01-01 ASSESSMENT — PATIENT HEALTH QUESTIONNAIRE - PHQ9
SUM OF ALL RESPONSES TO PHQ QUESTIONS 1-9: 0
2. FEELING DOWN, DEPRESSED OR HOPELESS: 0
SUM OF ALL RESPONSES TO PHQ9 QUESTIONS 1 & 2: 0
1. LITTLE INTEREST OR PLEASURE IN DOING THINGS: 0

## 2017-01-01 ASSESSMENT — PAIN DESCRIPTION - PROGRESSION: CLINICAL_PROGRESSION: NOT CHANGED

## 2017-01-01 ASSESSMENT — PAIN DESCRIPTION - ONSET: ONSET: ON-GOING

## 2017-04-10 PROBLEM — J34.89 LESION OF NOSE: Status: ACTIVE | Noted: 2017-01-01

## 2017-04-10 PROBLEM — Z93.3 STATUS POST COLOSTOMY (HCC): Status: ACTIVE | Noted: 2017-01-01

## 2017-06-13 PROBLEM — H10.33 ACUTE BACTERIAL CONJUNCTIVITIS OF BOTH EYES: Status: ACTIVE | Noted: 2017-01-01

## 2017-06-13 PROBLEM — K56.609 SMALL BOWEL OBSTRUCTION (HCC): Status: ACTIVE | Noted: 2017-01-01

## 2017-06-13 PROBLEM — Z93.3 STATUS POST COLOSTOMY (HCC): Chronic | Status: ACTIVE | Noted: 2017-01-01

## 2017-06-13 PROBLEM — E87.5 HYPERKALEMIA: Status: ACTIVE | Noted: 2017-01-01

## 2017-06-13 PROBLEM — N17.9 AKI (ACUTE KIDNEY INJURY) (HCC): Status: ACTIVE | Noted: 2017-01-01

## 2017-06-13 PROBLEM — E87.5 HYPERKALEMIA: Status: RESOLVED | Noted: 2017-01-01 | Resolved: 2017-01-01

## 2017-10-12 NOTE — PROGRESS NOTES
Chronic Disease Visit Information    BP Readings from Last 3 Encounters:   10/12/17 138/80   09/11/17 130/80   07/27/17 (!) 170/60          Hemoglobin A1C (%)   Date Value   11/18/2016 5.5   06/20/2014 5.2   11/01/2012 5.4     LDL Cholesterol (mg/dL)   Date Value   03/28/2016 27     HDL (mg/dL)   Date Value   03/28/2016 91     BUN (mg/dL)   Date Value   08/04/2017 15     CREATININE (no units)   Date Value   08/04/2017 1.16     Glucose (mg/dL)   Date Value   08/04/2017 103   11/03/2011 97            Have you changed or started any medications since your last visit including any over-the-counter medicines, vitamins, or herbal medicines? no   Are you having any side effects from any of your medications? -  no  Have you stopped taking any of your medications? Is so, why? -  no    Have you seen any other physician or provider since your last visit? No  Have you had any other diagnostic tests since your last visit? No  Have you been seen in the emergency room and/or had an admission to a hospital since we last saw you? No  Have you had your annual diabetic retinal (eye) exam? No  Have you had your routine dental cleaning in the past 6 months? no    Have you activated your [employer] account? If not, what are your barriers?  Yes     Patient Care Team:  Teresa De La O MD as PCP - General (Internal Medicine)  Indiana Baldwin (Certified Nurse Practitioner)  Ailyn Jules MD as Consulting Physician (Urology)  Rachel Antoine RN as Care Coordinator  Sridhar Zimmerman MD as Surgeon (Cardiology)         Medical History Review  Past Medical, Family, and Social History reviewed and does contribute to the patient presenting condition  Chief Complaint   Patient presents with    Shortness of Breath     worsening sob     Fatigue     feels very tired     Edema     le edema     Health Maintenance   Topic Date Due    DTaP/Tdap/Td vaccine (1 - Tdap) 11/16/1940    Flu vaccine (1) 09/01/2017    Pneumococcal low/med risk (2 of 2 - PPSV23) 11/18/2017

## 2017-10-12 NOTE — PROGRESS NOTES
Subjective:      Dawood Julian is a 80 y.o. female who presents today for follow up and management of her chronic medical conditions as noted below. HPI:    Dawood Julian is c/o of   Chief Complaint   Patient presents with    Shortness of Breath     worsening sob     Fatigue     feels very tired     Edema     le edema   . Fatigue  Worse   more sob    Le edema worse takes lasix q3 weeks     Past Medical History:   Diagnosis Date    Acute on chronic diastolic congestive heart failure ([address] 3/3/2016    CARISSA (acute kidney injury) ([address] 6/13/2017    Anemia, blood loss 4/2013    femoral fx    Bladder cancer ([address] 2016    NONINVASIVE PAPILLARY UROTHELIAL CARCINOMA low grade    CAD (coronary artery disease)     Colon cancer ([address] 1993    23 years ago     Colon cancer ([address]     COPD (chronic obstructive pulmonary disease) ([address]     Enlarged heart     Femoral fracture ([address] 2013    LT, at [facility]; fell while dancing; Fracture through the subtrochanteric portion of left femur. there is 25% lateral displacement    Hyperlipidemia     Hypertension     Prediabetes 4/12/13    A1c 5.9 [facility] Recurrent UTI     Skin cancer        Past Surgical History:   Procedure Laterality Date    COLON SURGERY      23 yrs ago    COLONOSCOPY      CYSTOSCOPY  08/09/2016    WITH CLOT EVACUATIONS-BLADDER BIOPSY & FULGERATION    FEMUR SURGERY Left 2013    [address] REVISION COLOSTOMY  2006    now on the right side       Family History   Problem Relation Age of Onset    Prostate Cancer Brother     Heart Attack Brother     Heart Attack Brother      another brother    Colon Cancer Neg Hx        Social History     Social History    Marital status:       Spouse name: N/A    Number of children: N/A    Years of education: N/A     Social History Main Topics    Smoking status: Never Smoker    Smokeless tobacco: Never Used    Alcohol use No    Drug use: No    Sexual activity: No     Other Topics Concern    None     Social History Narrative    None       Current Outpatient Prescriptions   Medication Sig Dispense Refill    megestrol (MEGACE) 40 MG tablet Take 40 mg by mouth daily      metoprolol tartrate (LOPRESSOR) 25 MG tablet Take 2 tablets by mouth 2 times daily 60 tablet 3    vitamin D (ERGOCALCIFEROL) 78314 units CAPS capsule Take 1 capsule by mouth once a week 12 capsule 3    furosemide (LASIX) 20 MG tablet Take 1 tablet by mouth daily 60 tablet 3    potassium chloride (KLOR-CON M) 20 MEQ extended release tablet Take 1 tablet by mouth daily 30 tablet 11    calcium carbonate (OSCAL) 500 MG TABS tablet Take 500 mg by mouth daily      acetaminophen (TYLENOL) 325 MG tablet Take 2 tablets by mouth every 4 hours as needed for Pain 120 tablet 3     No current facility-administered medications for this visit. Review of Systems:    Review of Systems   Constitutional: Positive for fatigue. Negative for activity change, appetite change, chills, diaphoresis and fever. HENT: Negative for congestion, dental problem, drooling, ear discharge, ear pain, facial swelling, hearing loss, mouth sores, nosebleeds, postnasal drip, rhinorrhea, sinus pressure, sneezing, sore throat, tinnitus, trouble swallowing and voice change. Eyes: Negative for photophobia, pain, discharge, redness, itching and visual disturbance. Respiratory: Positive for shortness of breath. Negative for apnea, cough, choking, chest tightness, wheezing and stridor. Cardiovascular: Positive for leg swelling. Negative for chest pain and palpitations. Gastrointestinal: Negative for abdominal distention, abdominal pain, anal bleeding, blood in stool, constipation, diarrhea, nausea, rectal pain and vomiting. Endocrine: Negative for cold intolerance, heat intolerance, polydipsia, polyphagia and polyuria.    Genitourinary: Negative for decreased urine volume, difficulty urinating, dyspareunia, dysuria, enuresis, Encounter   Medications    metoprolol tartrate (LOPRESSOR) 25 MG tablet     Sig: Take 2 tablets by mouth 2 times daily     Dispense:  60 tablet     Refill:  3     Increase lasix qd    Low salt diet    Fluid restriction                   Feli received counseling on the following healthy behaviors: exercise  Reviewed prior labs and health maintenance  Continue current medications, diet and exercise. Discussed use, benefit, and side effects of prescribed medications. Barriers to medication compliance addressed. Patient given educational materials - see patient instructions  Was a self-tracking handout given in paper form or via [employer]? Yes    Requested Prescriptions     Signed Prescriptions Disp Refills    metoprolol tartrate (LOPRESSOR) 25 MG tablet 60 tablet 3     Sig: Take 2 tablets by mouth 2 times daily       All patient questions answered. Patient voiced understanding. Quality Measures    Body mass index is 21.52 kg/m². Elevated. Weight control planned discussed daily exercise regimen and Healthy diet and regular exercise. BP: 138/80. Blood pressure is normal. Treatment plan consists of Dietary Sodium Restriction and No treatment change needed. Fall Risk 9/19/2017 8/4/2017 7/27/2017 6/28/2016   2 or more falls in past year? no no no no   Fall with injury in past year? no no yes no     The patient does not have a history of falls. I did , complete a risk assessment for falls. A plan of care for falls home safety tips provided, No Treatment plan indicated    Lab Results   Component Value Date    LDLCHOLESTEROL 27 03/28/2016    (goal LDL reduction with dx if diabetes is 50% LDL reduction)    PHQ Scores 7/27/2017 6/20/2016 6/20/2016   PHQ2 Score 0 0 0   PHQ9 Score 0 0 0     Interpretation of Total Score Depression Severity: 1-4 = Minimal depression, 5-9 = Mild depression, 10-14 = Moderate depression, 15-19 = Moderately severe depression, 20-27 = Severe depression        1.   Patient given

## 2017-10-13 NOTE — CARE COORDINATION
Future Appointments  Date Time Provider Rc [place]   11/14/2017 1:45 PM Re Cabrera MD [facility]   12/14/2017 2:15 PM Re Cabrera MD [address]   1/9/2018 9:30 AM Tristen Del Rosario  [address] will reach out to patient in 2-3 weeks for follow up. As a reminder, RNCC explained the importance of first calling patient's PCP for an appointment instead going the Emergency Dept especially Monday- Friday during office hours for non-emergency concerns/complaints. Writer advised patient to speak to [facility] or [address] [place] [address] to assist them with the issue and attempt to get a same day/sick call appt before they decide to go to the ER. Writer also stressed that they can call [facility] for any help regarding appointments,medications and questions/concerns with their health management, patient verbalized understanding. [facility] contact information given to patient.      Hermila Weber RN  Ambulatory Care Coordinator                    Hermila Weber RN  Ambulatory Care Coordinator

## 2017-10-13 NOTE — TELEPHONE ENCOUNTER
Patient was just here Thursday and her metorpolol was increased to 25mg take 2 tabs BID, but only a qty of 60 tablets was called in. Will need a CORRECT script called into pharmacy counter.

## 2017-11-11 NOTE — PROCEDURES
[address]                   [address] [facility], [address]                                PULMONARY FUNCTION    PATIENT NAME: David Argueta                   :        1921  MED REC NO:   [id number]                              ROOM:  ACCOUNT NO:   [de-identified]                           ADMIT DATE: 2017  PROVIDER:     Heber Lind    DATE OF PROCEDURE:  2017    INTERPRETATION:  Lung mechanics reveal FEV1 is 0.83 L at 100% of predicted. FVC is 0.91 L at 76% of predicted. FEV1/FVC ratio is 92%. FEV1/FEV6 ratio  is 91%. FEF 25-75 is 1.12 L/sec at 415% of predicted. Lung volumes reveal  total lung capacity is 2.32 L at 56% of predicted. Residual volume is 1.46  L at 61% of predicted. Airway resistance is normal.  The patient could not  do the diffusion capacity maneuver. No changes noted in lung mechanics  after bronchodilators. IMPRESSION:  Lung volumes are suggestive of restrictive ventilatory defect  of mild severity. Clinical correlation is recommended.         Milind Mancini    D: 11/10/2017 18:38:05       T: 2017 3:00:11     [email or web address]  Job#: [id number]     Doc#: [id number]

## 2017-11-14 PROBLEM — J90 PLEURAL EFFUSION: Status: ACTIVE | Noted: 2017-01-01

## 2017-11-14 NOTE — H&P
[address] [facility]    HISTORY AND PHYSICAL EXAMINATION            Date:   11/14/2017  Patient name:  Michael James  Date of admission:  11/14/2017  2:51 PM  MRN:   [id number]  Account:  [de-identified]  YOB: 1921  PCP:    Carol Hernández MD  Room:   2092/2092-01  Code Status:    DNR-CCA    Chief Complaint:     No chief complaint on file. Direct admit from Dr. Pavel Padilla of breath x 3-4 months progressively worsening    History Obtained From:     patient    History of Present Illness: The patient is a frail and weak-looking  80 y.o. Non-/non  female who presents as direct admit for evaluation and management of pleural effusion. The patient states that she has been having shortness of breath for the past few months that has worsened in the past 3 weeks. It is NOT associated with chest pain, nausea, vomiting, or diaphoresis. The patient denies wheezing, sputum production, fevers, and chills. The patient had a CT chest done on 11/9 which showed:    Mild to moderate right-sided pleural effusion   Advanced cardiomegaly.  Mild amount of pericardial fluid. She said that Dr. Terri Sherman had tried to treat her with water pills but that did not work and the fluids are in pockets of the lung so he sent her over from the office to be evaluated at [facility]. The patient was told that Interventional Radiology would possibly do a thoracocentesis. Her pro-BNP on 10/26 was elevated at 32, 238. The patient also complained of fatigue and decreased appetite for the past few weeks. She lives at home with her daughter and has barely ate any food over the few days. The patient denies any abdominal pain, change in bowel, or dysuria. The patient denied any other concerns.        Past Medical History:     Past Medical History:   Diagnosis Date    Acute on chronic diastolic congestive heart failure ([address] [facility] [address] 3/3/2016    diaphoretic. Nursing note and vitals reviewed. Investigations:      Laboratory Testing:  No results found for this or any previous visit (from the past 24 hour(s)). Imaging/Diagnostics:  Ct Chest High Resolution    Result Date: 11/9/2017  EXAMINATION: CT IMAGES OF THE CHEST WITHOUT CONTRAST, HIGH RESOLUTION 11/9/2017 TECHNIQUE: CT of the chest was performed without the administration of intravenous contrast. High resolution CT imaging was performed of the lungs. Multiplanar reformatted images are provided for review. Dose modulation, iterative reconstruction, and/or weight based adjustment of the mA/kV was utilized to reduce the radiation dose to as low as reasonably achievable. High resolution CT images were performed in the supine inspiration and expiration positions. COMPARISON: CT abdomen pelvis June 13, 2017 HISTORY: ORDERING SYSTEM PROVIDED HISTORY: SOB (shortness of breath) TECHNOLOGIST PROVIDED HISTORY: Ordering Physician Provided Reason for Exam: PT STATES SHE IS HAVING SOB X 2 WEEKS, PT UNABLE TO LAY PRONE AND UNABLE TO COMPLETE THE PRONE PORTION OF THE TEST Acuity: Acute Type of Exam: Ongoing FINDINGS: Mediastinum: No evidence of mediastinal lymphadenopathy. Advanced cardiomegaly. Mild amount of pericardial fluid. Extensive coronary artery calcification. Thoracic aorta is non aneurysmal with mild to moderate atherosclerosis. Mild heterogeneity of the right thyroid lobe. HRCT Findings/Lungs/pleura: Mild to moderate right-sided pleural effusion. No pneumothorax or focal airspace consolidation. Mild right-sided interlobular septal thickening and hazy attenuation. No ground-glass opacities, centrilobular nodularity, traction bronchiectasis, fibrosis or honeycombing. Biapical scarring is present. Upper Abdomen: Extensive splenic artery calcification. Few stable subcentimeter hypodense hepatic lesions largest measuring 9 mm which are benign likely cysts.  Soft Tissues/Bones: No acute bone care not provided in a hospital setting. Paz Tamayo MD  11/14/2017  4:08 PM    Copy sent to Dr. Ramone Marin MD    I have discussed the care of Des Ralph , including pertinent history and exam findings,    today with the resident. I have seen and examined the patient and the key elements of all parts of the encounter have been performed by me . I agree with the assessment, plan and orders as documented by the resident.           Electronically signed by Barber Hodgson MD

## 2017-11-14 NOTE — CARE COORDINATION
ADMISSION NOTE       Patient admitted to room  2092. Time of admit:  [address] from:   Office    Reason for admission:  Pleural Effusion    Where patient has been residing for the last 24 hrs:  Private residence    Has the patient been admitted to any facility in the last 4 weeks, which one:  No    Family at bedside:  Two daughters and son    Patient is currently in pain Denies    Patient has been oriented to room, educated on how to use call light, and to call for assistance prior to getting up. Bed in lowest and locked position. 2 siderails up for safety. Call light within reach. [facility]  DVT Prophylaxis and Vaccine Status  Work List  Mandatory for all patients      Patient must be on both Chemical prophylaxis and Mechanical prophylaxis. If chemical/mechanical prophylaxis is not ordered, the physician must document a reason for not using prophylaxis     Chemical Prophylaxis  Is patient on chemical prophylaxis: Yes  If no chemical prophylaxis Is a order in for No Chemical VTE prophylaxisNo  If no was the physician notified not applicable      Mechanical Prophylaxis  Is patient on mechanical prophylaxis, intermittent pneumatic compression device: Yes  If no was the physician notified not applicable        Pneumonia Vaccine  Vaccine indicated:  Vaccination was ordered  If indicated was the vaccine given: not applicable    Influenza Vaccine (applicable from October through March):  Vaccine indicated: Vaccination was ordered  If indicated was the vaccine given: not applicable    Patient Education  Education completed on DVT prophylaxis: yes           [facility]  CHF Core Measure Compliance  Work List    Please add the following education teaching points to education template for CHF Education. 1. Diet:  2. Activity  3. Follow up  4. Medications  5. Weight Monitoring  6.  Symptoms worsening    Education Information given to patient on admission:  Heart Failure, Patient

## 2017-11-14 NOTE — PROGRESS NOTES
Chronic Disease Visit Information    BP Readings from Last 3 Encounters:   10/12/17 138/80   09/11/17 130/80   07/27/17 (!) 170/60          Hemoglobin A1C (%)   Date Value   11/18/2016 5.5   06/20/2014 5.2   11/01/2012 5.4     LDL Cholesterol (mg/dL)   Date Value   03/28/2016 27     HDL (mg/dL)   Date Value   03/28/2016 91     BUN (mg/dL)   Date Value   10/26/2017 24 (H)     CREATININE (mg/dL)   Date Value   10/26/2017 1.26 (H)     Glucose (mg/dL)   Date Value   10/26/2017 110 (H)   11/03/2011 97            Have you changed or started any medications since your last visit including any over-the-counter medicines, vitamins, or herbal medicines? no   Are you having any side effects from any of your medications? -  no  Have you stopped taking any of your medications? Is so, why? -  no    Have you seen any other physician or provider since your last visit? No  Have you had any other diagnostic tests since your last visit? Yes - Records Obtained  Have you been seen in the emergency room and/or had an admission to a hospital since we last saw you? No  Have you had your annual diabetic retinal (eye) exam? No  Have you had your routine dental cleaning in the past 6 months? no    Have you activated your [employer] account? If not, what are your barriers?  Yes     Patient Care Team:  Anaya Cano MD as PCP - General (Internal Medicine)  Matt Handy MD as PCP - S Attributed Provider  Randee Duron CNP (Certified Nurse Practitioner)  Quique Webb MD as Consulting Physician (Urology)  Laura Layne RN as Care Coordinator  Allyson Velázquez MD as Surgeon (Cardiology)         Medical History Review  Past Medical, Family, and Social History reviewed and does contribute to the patient presenting condition    Chief Complaint   Patient presents with    Shortness of Breath     pft, ct chest    Anorexia     weight loss is 11 lbs     Fatigue     very tired      Health Maintenance   Topic Date Due    DTaP/Tdap/Td
 Sexual activity: No     Other Topics Concern    None     Social History Narrative    None       Current Outpatient Prescriptions   Medication Sig Dispense Refill    ferrous sulfate 325 (65 Fe) MG tablet       metoprolol tartrate (LOPRESSOR) 25 MG tablet Take 2 tablets by mouth 2 times daily 120 tablet 3    megestrol (MEGACE) 40 MG tablet Take 40 mg by mouth daily      vitamin D (ERGOCALCIFEROL) 04516 units CAPS capsule Take 1 capsule by mouth once a week 12 capsule 3    furosemide (LASIX) 20 MG tablet Take 1 tablet by mouth daily 60 tablet 3    potassium chloride (KLOR-CON M) 20 MEQ extended release tablet Take 1 tablet by mouth daily 30 tablet 11    calcium carbonate (OSCAL) 500 MG TABS tablet Take 500 mg by mouth daily      acetaminophen (TYLENOL) 325 MG tablet Take 2 tablets by mouth every 4 hours as needed for Pain 120 tablet 3     No current facility-administered medications for this visit. Review of Systems:    Review of Systems   Constitutional: Positive for appetite change and fatigue. Negative for activity change, chills, diaphoresis and fever. HENT: Negative for congestion, dental problem, drooling, ear discharge, ear pain, facial swelling, hearing loss, mouth sores, nosebleeds, postnasal drip, rhinorrhea, sinus pressure, sneezing, sore throat, tinnitus, trouble swallowing and voice change. Eyes: Negative for photophobia, pain, discharge, redness, itching and visual disturbance. Respiratory: Positive for shortness of breath. Negative for apnea, cough, choking, chest tightness, wheezing and stridor. Cardiovascular: Negative for chest pain, palpitations and leg swelling. Gastrointestinal: Negative for abdominal distention, abdominal pain, anal bleeding, blood in stool, constipation, diarrhea, nausea, rectal pain and vomiting. Endocrine: Negative for cold intolerance, heat intolerance, polydipsia, polyphagia and polyuria.    Genitourinary: Negative for decreased urine

## 2017-11-15 NOTE — PLAN OF CARE
Problem: Falls - Risk of  Goal: Absence of falls  Outcome: Ongoing  Bed locked and in lowest position. Call light in reach. Side rails up x2. Non skid slips on. No falls this shift    Problem: Risk for Impaired Skin Integrity  Goal: Tissue integrity - skin and mucous membranes  Structural intactness and normal physiological function of skin and  mucous membranes. Outcome: Ongoing  Skin assessed and charted on in head to toe. Will continue to promote circulation and and address any areas of concern.

## 2017-11-15 NOTE — PROGRESS NOTES
Nutrition Assessment    Type and Reason for Visit: Positive Nutrition Screen, Consult (Unplanned weight loss, decreased appetite. Dietitian consult needed: wt. loss, poor appetite, poor intake. Consult: CHF)    Nutrition Recommendations: Continue Cardiac diet and 1500 mL fluid restriction. Start Ensure [employer] 1x/day. Malnutrition Assessment:  · Malnutrition Status: Meets the criteria for severe malnutrition  · Context: Acute illness or injury  · Findings of the 6 clinical characteristics of malnutrition (Minimum of 2 out of 6 clinical characteristics is required to make the diagnosis of moderate or severe Protein Calorie Malnutrition based on AND/ASPEN Guidelines):  1. Energy Intake-Less than or equal to 50%, greater than or equal to 1 month    2. Weight Loss-10% loss or greater, in 1 month  3. Fat Loss-Unable to assess,    4. Muscle Loss-Unable to assess,    5. Fluid Accumulation-Mild fluid accumulation, Extremities  6.  Strength-Not measured    Nutrition Diagnosis:   · Problem: Altered nutrition-related lab values  · Etiology: related to Cardiac dysfunction (CHF)     Signs and symptoms:  as evidenced by Lab values, Diet history of poor intake, Weight loss, Weight loss greater than or equal to 5% in 1 month    Nutrition Assessment:  · Subjective Assessment: RN reports patient is out of room for thoracentesis. Patient has been weak and having a decreased appetite for a couple of months.    · Wound Type: None  · Current Nutrition Therapies:  · Oral Diet Orders: Cardiac, Fluid Restriction (1500 mL)   · Oral Diet intake: Unable to assess  · Anthropometric Measures:  · Ht: 4' 7\" (139.7 cm)   · Current Body Wt: 83 lb (37.6 kg)  · Admission Body Wt: 83 lb (37.6 kg)  · Usual Body Wt: 93 lb (42.2 kg) (10/12/17)  · % Weight Change: 12.0%,  1 month  · Ideal Body Wt: 93 lb (42.2 kg), % Ideal Body 89%  · BMI Classification: BMI 18.5 - 24.9 Normal Weight  · Comparative Standards (Estimated Nutrition

## 2017-11-15 NOTE — FLOWSHEET NOTE
11/15/17 1705   Encounter Summary   Services provided to: Patient and family together   Referral/Consult From: [facility] Family members   Continue Visiting (11/15/2017)   Complexity of Encounter Low   Length of Encounter 15 minutes   Routine   Type Initial   Assessment Calm; Hopeful   Intervention Active listening;Nurtured hope;Sustaining presence/ Ministry of presence   Outcome Expressed gratitude   PT with son and daughter in room, she may get to go home tomorrow. The family is plaining a party for her it is her 80 th birthday. Allyson Otero is very pleasant to talk to they seem like a close family. Chaplains will remain available to offer spiritual and emotional support as needed.

## 2017-11-15 NOTE — PROGRESS NOTES
[address] [facility]    Progress Note    11/15/2017    1:46 PM    Name:   Jeanne Givens  MRN:     [id number]     Azizalyside:      [de-identified]   Room:   2092/2092-01  IP Day:  1  Admit Date:  11/14/2017  2:51 PM    PCP:   Calos Mirza MD  Code Status:  DNR-CCA    Subjective:     C/C: No chief complaint on file. Direct admit from Dr. Lyudmila Ruth office for pleural effusion management w/ possible thoracocentesis    Interval History Status: not changed. Patient is lying comfortably in bed. She denied any acute events overnight. She is not complaining of anything new or concerning. The patient states that she is tolerating diet well. She had decreased appetite over the past week so this makes her feel better. IR for thoracocentesis has been requested, still waiting for them to see the patient. All labs this morning are wnl, the patient's pro-BNP is 28,000 down from 31,000 on 10/26    Brief History:     The patient states that she has been having shortness of breath for the past few months that has worsened in the past 3 weeks. It is NOT associated with chest pain, nausea, vomiting, or diaphoresis. The patient denies wheezing, sputum production, fevers, and chills. The patient had a CT chest done on 11/9 which showed:     Mild to moderate right-sided pleural effusion   Advanced cardiomegaly.  Mild amount of pericardial fluid.      She said that Dr. Crystal Marie had tried to treat her with water pills but that did not work and the fluids are in pockets of the lung so he sent her over from the office to be evaluated at [facility]. The patient was told that Interventional Radiology would possibly do a thoracocentesis. Her pro-BNP on 10/26 was elevated at 32, 238. The patient also complained of fatigue and decreased appetite for the past few weeks. She lives at home with her daughter and has barely ate any food over the few days.  The patient denies any abdominal pain, administration of intravenous contrast. High resolution CT imaging was performed of the lungs. Multiplanar reformatted images are provided for review. Dose modulation, iterative reconstruction, and/or weight based adjustment of the mA/kV was utilized to reduce the radiation dose to as low as reasonably achievable. High resolution CT images were performed in the supine inspiration and expiration positions. COMPARISON: CT abdomen pelvis June 13, 2017 HISTORY: ORDERING SYSTEM PROVIDED HISTORY: SOB (shortness of breath) TECHNOLOGIST PROVIDED HISTORY: Ordering Physician Provided Reason for Exam: PT STATES SHE IS HAVING SOB X 2 WEEKS, PT UNABLE TO LAY PRONE AND UNABLE TO COMPLETE THE PRONE PORTION OF THE TEST Acuity: Acute Type of Exam: Ongoing FINDINGS: Mediastinum: No evidence of mediastinal lymphadenopathy. Advanced cardiomegaly. Mild amount of pericardial fluid. Extensive coronary artery calcification. Thoracic aorta is non aneurysmal with mild to moderate atherosclerosis. Mild heterogeneity of the right thyroid lobe. HRCT Findings/Lungs/pleura: Mild to moderate right-sided pleural effusion. No pneumothorax or focal airspace consolidation. Mild right-sided interlobular septal thickening and hazy attenuation. No ground-glass opacities, centrilobular nodularity, traction bronchiectasis, fibrosis or honeycombing. Biapical scarring is present. Upper Abdomen: Extensive splenic artery calcification. Few stable subcentimeter hypodense hepatic lesions largest measuring 9 mm which are benign likely cysts. Soft Tissues/Bones: No acute bone or soft tissue abnormality. Mild exaggerated kyphosis along the upper thoracic spine. 1 cm nodular density in the right breast soft tissues. 1. Mild to moderate right-sided pleural effusion with mild right-sided interlobular septal thickening and hazy attenuation. Advanced cardiomegaly with mild pericardial effusion.   Findings may relate to CHF with unilateral right-sided edema. 2. No evidence of interstitial lung disease. 3. Multiple incidental/chronic findings as above including right breast nodular density which is nonspecific and may represent an intramammary lymph node, consider correlation with diagnostic mammogram/ultrasound as clinically warranted. Physical Examination:        Physical Exam   Constitutional: She is oriented to person, place, and time and well-developed, well-nourished, and in no distress. No distress. HENT:   Head: Normocephalic and atraumatic. Cardiovascular: Normal rate, regular rhythm and normal heart sounds. Pulmonary/Chest: Effort normal and breath sounds normal. No respiratory distress. She has no wheezes. Abdominal: Soft. Bowel sounds are normal. She exhibits no distension. There is no tenderness. Neurological: She is alert and oriented to person, place, and time. Skin: Skin is warm and dry. She is not diaphoretic. Nursing note and vitals reviewed.         Assessment:        Primary Problem  Pleural effusion    Active Hospital Problems    Diagnosis Date Noted    Pleural effusion [J90] 11/14/2017    Chronic diastolic congestive heart failure ([facility] [address] [I50.32] 04/06/2016    Frail elderly [R54] 03/02/2016    Essential hypertension [I10] 02/22/2016       Plan:        Pleural effusion  -pt has SOB x 3 weeks  -CT chest on 11/9:  Mild to moderate right-sided pleural effusion  -Acc to pt, lasix was attempted but effusion still present/SOB still persists  -BNP on 10/26 elevated to 32,000, repeat BNP and follow  -Daily:    -CBC: WBC elevated mildly: 12.9, all else wnl   -BMP: all wnl  -Consult IR for thoracocentesis  -Fluid analysis for pH, cytology, glucose, albumin, LDH  -begin patient on heparin gtt  -diet: cardiac with fluid restriction to 1500 mL  -IV lasix 20 mg     Essential hypertension  -lopressor 50 mg PO  -aldactone 25 mg PO     COPD  -albuterol PRN q6      Joe Lipscomb MD  11/15/2017  1:46 PM     I have discussed the care of Aidan Lr , including pertinent history and exam findings,    today with the resident. I have seen and examined the patient and the key elements of all parts of the encounter have been performed by me . I agree with the assessment, plan and orders as documented by the resident.      Principal Problem:    Pleural effusion  Active Problems:    Essential hypertension    Frail elderly    Chronic diastolic congestive heart failure ([facility] [address]         Electronically signed by Naveed Burroughs MD

## 2017-11-15 NOTE — CARE COORDINATION
signed by: Memo Palacios RN on 11/15/2017 at 9:55 AM     Faxed demographics/payor information and home health referral to [facility].     Electronically signed by Memo Palacios RN on 11/15/2017 at 9:59 AM

## 2017-11-15 NOTE — CARE COORDINATION
[address] Transitions Interview     11/15/2017    Patient: Ronnie Garcia Patient : 1921   MRN: [id number]  Reason for Admission: There are no discharge diagnoses documented for the most recent discharge. RARS: Yoselin  21.5       Spoke with: Paloma Shannon and her daughter Steven Salinas      Readmission Risk  Patient Active Problem List   Diagnosis    Essential hypertension    Osteopenia determined by x-ray    Hyperlipidemia with target LDL less than 100    Intestinal malabsorption    History of fracture of left hip    Ankle edema    Frail elderly    Chronic diastolic CHF (congestive heart failure) (HCC)    Vitamin D deficiency    Chronic diastolic congestive heart failure (HCC)    History of colon cancer    Gross hematuria    Bladder mass    Unsteady gait    Malignant neoplasm of overlapping sites of bladder ([facility] [address]    Anemia due to blood loss    Hyperglycemia    Lesion of nose    Status post colostomy (HCC)    Small bowel obstruction    CARISSA (acute kidney injury) ([facility] [address]    Acute bacterial conjunctivitis of both eyes    Pleural effusion       Inpatient Assessment  Care Transitions Summary    Care Transitions Inpatient Review  Medication Review  Do you have all of your prescriptions and are they filled?:  Yes   Barriers to Medication Adherence:  None  Are you able to afford your medications?:  Yes  How often do you have difficulty taking your medications?:  I always take them as prescribed. Housing Review  Who do you live with?:  Child  Are you an active caregiver in your home?:  No  Social Support  Do you have a [facility]?:  Yes  Robinsonenoc Chavez Name:  [address] [place] [address] Nosto Equipment  Patient DME:  Mae Beard, Wheelchair, Other  Other Patient DME:  raised toilet seat  Functional Review  Ability to seek help/take action for Emergent/Urgent situations i.e. fire, crime, inclement weather or health crisis. :  Independent  Ability handle personal hygiene needs (bathing/dressing/grooming): Independent  Ability to manage medications:  Needs Assistance  Ability to prepare food:  Dependent  Ability to maintain home (clean home, laundry):  Dependent  Ability to drive and/or has transportation:  Dependent  Ability to do shopping:  Dependent  Ability to manage finances:  Dependent  Is patient able to live independently?:  No  Hearing and Vision  Visual Impairment:  None, Reading glasses  Hearing Impairment:  None  Care Transitions Interventions     Plan is to return home with [facility], if snf needed patient has been to the [facility] in past., will follow//JU    Follow Up  Future Appointments  Date Time Provider Rc [place]   12/14/2017 2:15 PM Jyoti Miller MD [address]   1/9/2018 9:30 AM MD SEYMOUR Bello [address] Maintenance  There are no preventive care reminders to display for this patient.     Brenda Collazo RN

## 2017-11-16 PROBLEM — E43 SEVERE PROTEIN-CALORIE MALNUTRITION (HCC): Status: ACTIVE | Noted: 2017-01-01

## 2017-11-16 NOTE — PROGRESS NOTES
Bronchodilator Assessment     RR 18  Breath Sounds: diminshed  SPO2: 94%  FiO2: room air      · Bronchodilator assessment at level  Home no meds will be discontinued  ·   · [x]    Bronchodilator Assessment  BRONCHODILATOR ASSESSMENT SCORE  Score 0 1 2 3 4 5   Breath Sounds   [x]  Patient Baseline []  No Wheeze good aeration []  Faint, scattered wheezing, good aeration []  Expiratory Wheezing and or moderately diminished []  Insp/Exp wheeze and/or very diminished []  Insp/Exp and/ or marked distress   Respiratory Rate   [x]  Patient Baseline []  Less than 20 []  Less than 20 []  20-25 []  Greater than 25 []  Greater than 25   Peak flow % of Pred or PB [x]  NA   []  Greater than 90%  []  81-90% []  71-80% []  Less than or equal to 70%  or unable to perform []  Unable due to Respiratory Distress   Dyspnea re [x]  Patient Baseline []  No SOB []  No SOB []  SOB on exertion []  SOB min activity []  At rest/acute   e FEV% Predicted       [x]  NA []  Above 69%  []  Unable []  Above 60-69%  []  Unable []  Above 50-59%  []  Unable []  Above 35-49%  []  Unable []  Less than 35%  []  Unable          Justice Diallo      8:46 AM

## 2017-11-16 NOTE — CARE COORDINATION
ONGOING DISCHARGE PLAN:    Spoke with patient and family regarding discharge plan and they confirm that plan is still home with [facility]. Pt remains on IV Lasix 20mg daily. Pt had thoracentesis yesterday. Will continue to follow for additional discharge needs.     Electronically signed by Helga Ramirez RN on 11/16/2017 at 9:47 AM

## 2017-11-16 NOTE — PROGRESS NOTES
Patient's Daughter Joselo Molina came to my office asking about palliative care and hospice services. I went over in detail the differences between palliative care and hospice services. I also gave her some literature on aging parents. I gave her brochures on palliative care as well as hospice. She was appreciative and will share with her family. Joselo Molina states concern over patient's continual decline and quality of life. Emotional support offered. My contact information given to Daughter.     [address] [place] [address], RN  [phone]

## 2017-11-16 NOTE — DISCHARGE SUMMARY
[address]    Discharge Summary     Patient ID: Aidan Lr  :  1921   MRN: [id number]     ACCOUNT:  [de-identified]   Patient's PCP: Radha Abreu MD  Admit Date: 2017   Discharge Date: 2017     Length of Stay: 2  Code Status:  DNR-CCA  Admitting Physician: Radha bAreu MD  Discharge Physician: Dottie Dakin, MD     Active Discharge Diagnoses:     Primary Problem  Pleural effusion      [place] Problems    Diagnosis Date Noted    Severe protein-calorie malnutrition ([facility] [address] [E43] 2017    Pleural effusion [J90] 2017    Chronic diastolic congestive heart failure ([facility] [address] [I50.32] 2016    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 female who was admitted for the management of   Pleural effusion , presented to ER with a referral from Dr. Usman Bullard for direct admission. The patient had a CT chest done on  that showed right sided pleural effusion. The patient has a known history of CHF and her BNP was 28,000. The patient was treated initially with PO lasix, however, that did not improve her symptoms. Dr Usman Bullard decided to admit her for thoracocentesis and removal of fluid. In the hospital she had a thoracocentesis done which removed 300 mL of fluid. Fluid was suggestive of CHF. The patient was given IV lasix. Today on discharge she is feeling considerably better and is in a pleasant mood. Review of Systems   Constitutional: Positive for malaise/fatigue. Negative for chills, diaphoresis, fever and weight loss. Respiratory: Negative for cough and shortness of breath. Cardiovascular: Negative for chest pain, palpitations, orthopnea and claudication.    Gastrointestinal: Negative for abdominal pain, diarrhea, nausea IMAGES OF THE CHEST WITHOUT CONTRAST, HIGH RESOLUTION 11/9/2017 TECHNIQUE: CT of the chest was performed without the administration of intravenous contrast. High resolution CT imaging was performed of the lungs. Multiplanar reformatted images are provided for review. Dose modulation, iterative reconstruction, and/or weight based adjustment of the mA/kV was utilized to reduce the radiation dose to as low as reasonably achievable. High resolution CT images were performed in the supine inspiration and expiration positions. COMPARISON: CT abdomen pelvis June 13, 2017 HISTORY: ORDERING SYSTEM PROVIDED HISTORY: SOB (shortness of breath) TECHNOLOGIST PROVIDED HISTORY: Ordering Physician Provided Reason for Exam: PT STATES SHE IS HAVING SOB X 2 WEEKS, PT UNABLE TO LAY PRONE AND UNABLE TO COMPLETE THE PRONE PORTION OF THE TEST Acuity: Acute Type of Exam: Ongoing FINDINGS: Mediastinum: No evidence of mediastinal lymphadenopathy. Advanced cardiomegaly. Mild amount of pericardial fluid. Extensive coronary artery calcification. Thoracic aorta is non aneurysmal with mild to moderate atherosclerosis. Mild heterogeneity of the right thyroid lobe. HRCT Findings/Lungs/pleura: Mild to moderate right-sided pleural effusion. No pneumothorax or focal airspace consolidation. Mild right-sided interlobular septal thickening and hazy attenuation. No ground-glass opacities, centrilobular nodularity, traction bronchiectasis, fibrosis or honeycombing. Biapical scarring is present. Upper Abdomen: Extensive splenic artery calcification. Few stable subcentimeter hypodense hepatic lesions largest measuring 9 mm which are benign likely cysts. Soft Tissues/Bones: No acute bone or soft tissue abnormality. Mild exaggerated kyphosis along the upper thoracic spine. 1 cm nodular density in the right breast soft tissues.      1. Mild to moderate right-sided pleural effusion with mild right-sided interlobular septal thickening and hazy attenuation. Advanced cardiomegaly with mild pericardial effusion. Findings may relate to CHF with unilateral right-sided edema. 2. No evidence of interstitial lung disease. 3. Multiple incidental/chronic findings as above including right breast nodular density which is nonspecific and may represent an intramammary lymph node, consider correlation with diagnostic mammogram/ultrasound as clinically warranted. Ir Guided Thoracentesis Pleural    Result Date: 11/15/2017  PROCEDURE: ULTRASOUNDGUIDED RIGHT THORACENTESIS 11/15/2017 HISTORY: ORDERING SYSTEM PROVIDED HISTORY: pleural effusion/chf TECHNOLOGIST PROVIDED HISTORY: Reason for exam:->pleural effusion/chf Right pleural effusion TECHNIQUE: This procedure was performed by Dr. Aki Orta. Informed consent was obtained from the patient's son after a detailed explanation of the procedure including risks, benefits, and alternatives. Universal protocol was performed. The right posterior chest was prepped and draped in sterile fashion and local anesthesia was achieved with 1% lidocaine. Initial ultrasound evaluation shows a small to moderate right pleural effusion. Using realtime ultrasound guidance a 5 Kitt[place] centesis catheter/needle was advanced into the pleural fluid. Ultrasound images show safe tract and access into the fluid. Approximately 300 mL of dark robert colored fluid was drained. Little to no fluid remains on completion. Catheter was removed and a sterile dressing applied. There are no immediate complications. FINDINGS: A total of 300 mL of dark robert colored fluid  was removed. Fluid was sent for the requested studies. Successful ultrasound guided diagnostic and therapeutic right thoracentesis.          Consultations:    Consults:     Final Specialist Recommendations/Findings:   IP CONSULT TO DIETITIAN  IP CONSULT TO SOCIAL WORK  IP CONSULT TO INTERVENTIONAL RADIOLOGY      The patient was seen and examined on day of discharge and this discharge

## 2017-11-17 NOTE — DISCHARGE SUMMARY
[address]    Discharge Summary     Patient ID: Arelis Appiah  :  1921   MRN: [id number]     ACCOUNT:  [de-identified]   Patient's PCP: Jyoti Miller MD  Admit Date: 2017   Discharge Date: 2017     Length of Stay: 3  Code Status:  Prior  Admitting Physician: Jyoti Miller MD  Discharge Physician: Louisa Cedeño MD     Active Discharge Diagnoses:     Primary Problem  Pleural effusion      Hospital Problems  Active Hospital Problems    Diagnosis Date Noted    Acute CHF ([facility] [address] [I50.9] 2017    Severe protein-calorie malnutrition ([facility] [address] [E43] 2017    Pleural effusion [J90] 2017    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 female who was admitted for the management of   Pleural effusion , presented to ER with a referral from Dr. Sushma Roberson for direct admission. The patient had a CT chest done on  that showed right sided pleural effusion. The patient has a known history of CHF and her BNP was 28,000 and she was diagnosed with acute on chronic CHF upon this admission as well. The patient was treated initially with PO lasix, however, that did not improve her symptoms. Dr Sushma Roberson decided to admit her for thoracocentesis and removal of fluid. In the hospital she had a thoracocentesis done which removed 300 mL of fluid. Fluid was suggestive of CHF. The patient was given IV lasix. Today on discharge she is feeling considerably better and is in a pleasant mood. Review of Systems   Constitutional: Positive for malaise/fatigue. Negative for chills, diaphoresis, fever and weight loss. Respiratory: Negative for cough and shortness of breath. Cardiovascular: Negative for chest pain, palpitations, orthopnea and claudication.    Gastrointestinal: Negative for abdominal pain, diarrhea, nausea and vomiting. Musculoskeletal: Negative for myalgias. Neurological: Negative for weakness and headaches. Physical Exam   Constitutional: She is oriented to person, place, and time. She appears well-developed and well-nourished. No distress. HENT:   Head: Normocephalic and atraumatic. Cardiovascular: Normal rate and regular rhythm. Pulmonary/Chest: Effort normal and breath sounds normal. No respiratory distress. She has no wheezes. Abdominal: Soft. Bowel sounds are normal. She exhibits no distension. There is no tenderness. Neurological: She is alert and oriented to person, place, and time. Skin: Skin is warm and dry. She is not diaphoretic. Nursing note and vitals reviewed. Significant therapeutic interventions: thoracocentesis    Significant Diagnostic Studies:   Labs / Micro:  CBC:   Lab Results   Component Value Date    WBC 10.3 11/16/2017    RBC 4.40 11/16/2017    RBC 3.86 11/03/2011    HGB 13.5 11/16/2017    HCT 40.9 11/16/2017    MCV 93.0 11/16/2017    MCH 30.6 11/16/2017    MCHC 32.9 11/16/2017    RDW 17.0 11/16/2017     11/16/2017     11/03/2011             Radiology:    Xr Chest (single View Frontal)    Result Date: 11/15/2017  EXAMINATION: SINGLE VIEW OF THE CHEST 11/15/2017 10:46 am COMPARISON: 12/28/2016 HISTORY: ORDERING SYSTEM PROVIDED HISTORY: Post Right Thoracentesis-Inspiration TECHNOLOGIST PROVIDED HISTORY: Reason for exam:->Post Right Thoracentesis-Inspiration FINDINGS: No evidence pneumothorax status post pneumothorax. Mild residual pleural/parenchymal density right lung base likely due to residual effusion and atelectasis/airspace disease. The heart is enlarged with calcific plaque of an ectatic thoracic aorta. Aside from minor chronic appearing changes the lungs are otherwise clear. The bones are osteopenic. No evidence of pneumothorax status post right thoracentesis.      Ct Chest High Resolution    Result Date: 11/9/2017  EXAMINATION: CT IMAGES OF THE CHEST WITHOUT CONTRAST, HIGH RESOLUTION 11/9/2017 TECHNIQUE: CT of the chest was performed without the administration of intravenous contrast. High resolution CT imaging was performed of the lungs. Multiplanar reformatted images are provided for review. Dose modulation, iterative reconstruction, and/or weight based adjustment of the mA/kV was utilized to reduce the radiation dose to as low as reasonably achievable. High resolution CT images were performed in the supine inspiration and expiration positions. COMPARISON: CT abdomen pelvis June 13, 2017 HISTORY: ORDERING SYSTEM PROVIDED HISTORY: SOB (shortness of breath) TECHNOLOGIST PROVIDED HISTORY: Ordering Physician Provided Reason for Exam: PT STATES SHE IS HAVING SOB X 2 WEEKS, PT UNABLE TO LAY PRONE AND UNABLE TO COMPLETE THE PRONE PORTION OF THE TEST Acuity: Acute Type of Exam: Ongoing FINDINGS: Mediastinum: No evidence of mediastinal lymphadenopathy. Advanced cardiomegaly. Mild amount of pericardial fluid. Extensive coronary artery calcification. Thoracic aorta is non aneurysmal with mild to moderate atherosclerosis. Mild heterogeneity of the right thyroid lobe. HRCT Findings/Lungs/pleura: Mild to moderate right-sided pleural effusion. No pneumothorax or focal airspace consolidation. Mild right-sided interlobular septal thickening and hazy attenuation. No ground-glass opacities, centrilobular nodularity, traction bronchiectasis, fibrosis or honeycombing. Biapical scarring is present. Upper Abdomen: Extensive splenic artery calcification. Few stable subcentimeter hypodense hepatic lesions largest measuring 9 mm which are benign likely cysts. Soft Tissues/Bones: No acute bone or soft tissue abnormality. Mild exaggerated kyphosis along the upper thoracic spine. 1 cm nodular density in the right breast soft tissues.      1. Mild to moderate right-sided pleural effusion with mild right-sided interlobular septal thickening and hazy attenuation. Advanced cardiomegaly with mild pericardial effusion. Findings may relate to CHF with unilateral right-sided edema. 2. No evidence of interstitial lung disease. 3. Multiple incidental/chronic findings as above including right breast nodular density which is nonspecific and may represent an intramammary lymph node, consider correlation with diagnostic mammogram/ultrasound as clinically warranted. Ir Guided Thoracentesis Pleural    Result Date: 11/15/2017  PROCEDURE: ULTRASOUNDGUIDED RIGHT THORACENTESIS 11/15/2017 HISTORY: ORDERING SYSTEM PROVIDED HISTORY: pleural effusion/chf TECHNOLOGIST PROVIDED HISTORY: Reason for exam:->pleural effusion/chf Right pleural effusion TECHNIQUE: This procedure was performed by Dr. Hussein Wasserman. Informed consent was obtained from the patient's son after a detailed explanation of the procedure including risks, benefits, and alternatives. Universal protocol was performed. The right posterior chest was prepped and draped in sterile fashion and local anesthesia was achieved with 1% lidocaine. Initial ultrasound evaluation shows a small to moderate right pleural effusion. Using realtime ultrasound guidance a 5 [place] centesis catheter/needle was advanced into the pleural fluid. Ultrasound images show safe tract and access into the fluid. Approximately 300 mL of dark robert colored fluid was drained. Little to no fluid remains on completion. Catheter was removed and a sterile dressing applied. There are no immediate complications. FINDINGS: A total of 300 mL of dark robert colored fluid  was removed. Fluid was sent for the requested studies. Successful ultrasound guided diagnostic and therapeutic right thoracentesis.          Consultations:    Consults:     Final Specialist Recommendations/Findings:   IP CONSULT TO DIETITIAN  IP CONSULT TO SOCIAL WORK      The patient was seen and examined on day of discharge and this discharge summary is in conjunction with any daily progress note from day of discharge. Discharge plan:     Disposition: Home with [facility]    Physician Follow Up:     No follow-up provider specified. Requiring Further Evaluation/Follow Up POST HOSPITALIZATION/Incidental Findings: PLEASE FOLLOW UP WITH YOUR PRIMARY CARE DOCTOR FOR THE NEED OF POTASSIUM REPLACEMENT TABLETS. BMP/CBC in 3 days    Diet: regular diet    Activity: As tolerated    Instructions to Patient:     Discharge Medications:      Medication List      CONTINUE taking these medications    acetaminophen 325 MG tablet  Commonly known as:  TYLENOL  Take 2 tablets by mouth every 4 hours as needed for Pain     calcium carbonate 500 MG Tabs tablet  Commonly known as:  OSCAL     ferrous sulfate 325 (65 Fe) MG tablet     furosemide 20 MG tablet  Commonly known as:  LASIX  Take 1 tablet by mouth daily     megestrol 40 MG tablet  Commonly known as:  MEGACE     metoprolol tartrate 25 MG tablet  Commonly known as:  LOPRESSOR  Take 2 tablets by mouth 2 times daily     vitamin D 22163 units Caps capsule  Commonly known as:  ERGOCALCIFEROL  Take 1 capsule by mouth once a week        STOP taking these medications    potassium chloride 20 MEQ extended release tablet  Commonly known as:  KLOR-CON M            Time Spent on discharge is  31 mins in patient examination, evaluation, counseling as well as medication reconciliation, prescriptions for required medications, discharge plan and follow up. Electronically signed by   Cait Keene MD  11/20/2017  9:45 AM      Thank you Dr. Aurora Willett MD for the opportunity to be involved in this patient's care. Annabelle Marquez ads

## 2017-11-17 NOTE — CARE COORDINATION
Patient discharged today with [facility]. Spoke with [employer] and nothing needs faxed as they can view our system.   \  Electronically signed by Gabe Drake RN on 11/17/2017 at 5:52 PM

## 2017-11-17 NOTE — DISCHARGE INSTR - DIET

## 2017-11-18 NOTE — CARE COORDINATION
[facility]    Call within 2 business days of discharge: Yes    Patient: Les Dahl Patient : 1921   MRN: [id number]  Reason for Admission: Pleural Effusion  Discharge Date: 17 RARS: [facility] Risk Score: 21.5     Spoke with: Joselo Molina, patient's daughter    Facility: [address]    Non-face-to-face services provided:  Obtained and reviewed discharge summary and/or continuity of care documents     Spoke with patient's daughter Joselo Molina who states her mother is staying with her sister and her niece is helping out. Joselo Molina would like calls to come to her regarding her mother. She brought her mother home last night and stated she was doing fine but this am her sister reported to her that she was very weak, fatigued and not wanting to get out of bed. She states she has been refusing to use her walker and has been coughing up green \"chunky phlegm\"  She was unsure if her mother was running a temperature. [facility] home care is to follow. TC placed to [facility] to see when nurse would be out. According to intake, nurse just left home but did not indicate any issues with office. Requested that nurse call out to daughter Joselo Molina to give update on assessment. Medications reviewed by home care nurse, was not done during call. Patient is to f/u with PCP has appointment in December but will need to be moved closer to discharge date. I did place a call to Maryana Ramirez, patient's other daughter who is staying with patient. She states that patient is just tired and slept past noon today. She did not think she had a fever and says she is coughing up phlegm but was unsure of the color as her mother will not let her look at it. She states yesterday her mother was hoarse and today she has no voice. She did say that she feels her mother is going to need more care in the home soon other than just having home care come in as she works and can't be home all the time.   Expressed that patient has an

## 2017-11-20 PROBLEM — I50.9 ACUTE CHF (HCC): Status: ACTIVE | Noted: 2017-01-01

## 2017-11-20 NOTE — CARE COORDINATION
Date End Date Taking?  Authorizing Provider   ferrous sulfate 325 (65 Fe) MG tablet  11/8/17   Historical Provider, MD   metoprolol tartrate (LOPRESSOR) 25 MG tablet Take 2 tablets by mouth 2 times daily 10/13/17   Rebecca Hawkins MD   megestrol (MEGACE) 40 MG tablet Take 40 mg by mouth daily 10/9/17   Historical Provider, MD   vitamin D (ERGOCALCIFEROL) 55088 units CAPS capsule Take 1 capsule by mouth once a week 9/11/17   Jett Ornelas MD   furosemide (LASIX) 20 MG tablet Take 1 tablet by mouth daily 7/27/17   Jett Ornelas MD   calcium carbonate (OSCAL) 500 MG TABS tablet Take 500 mg by mouth daily     Historical Provider, MD   acetaminophen (TYLENOL) 325 MG tablet Take 2 tablets by mouth every 4 hours as needed for Pain 6/18/17   Jett Ornelas MD       Future Appointments  Date Time Provider [place] [place]   11/30/2017 10:45 AM Lyle Mathews NP [address]   12/14/2017 2:15 PM Jett Ornelas MD [address]   1/9/2018 9:30 AM Owen Simmons MD [address] [place] [address]     Mustapha Liang, RN  Ambulatory Care Coordinator

## 2017-11-22 NOTE — PROGRESS NOTES
.Date Patient Discharged:  11/17/17    Today's Date:  11/20/17    Spoke with:  Daughter--Hollie Hawkins    Do you understand the purpose of your medications?:  Yes    Do you understand the side effects of your new medications?:  Yes    Would you like to speak with a pharmacist about any of your medications or the side effects?:  No    Were you able to get your prescriptions filled?:  Yes    Did you understand your discharge instructions and what you are responsible for in managing your health after discharge?:  Yes    While you were here, was your call light answered promptly?:  Yes    Was the area around your room kept quiet at night?:  Not Sure    Were your room and bathroom kept clean?: Yes

## 2017-11-25 NOTE — CARE COORDINATION
[place]   11/30/2017 10:45 AM Clarence Albrecht NP [address]   12/14/2017 2:15 PM Carol Hernández MD [address]   1/9/2018 9:30 AM Analilia Bauer MD [address] [place]

## 2017-11-27 ENCOUNTER — TELEPHONE (OUTPATIENT)
Dept: INTERNAL MEDICINE CLINIC | Age: 82
End: 2017-11-27

## 2017-11-28 ENCOUNTER — CARE COORDINATION (OUTPATIENT)
Dept: CARE COORDINATION | Age: 82
End: 2017-11-28

## 2017-11-28 NOTE — CARE COORDINATION
This patient was permanently screened out of Care Coordination on 2017 for the following reason: Patient     Cecy Crawford RN  Ambulatory Care Coordinator